# Patient Record
Sex: FEMALE | Race: BLACK OR AFRICAN AMERICAN | Employment: FULL TIME | URBAN - NONMETROPOLITAN AREA
[De-identification: names, ages, dates, MRNs, and addresses within clinical notes are randomized per-mention and may not be internally consistent; named-entity substitution may affect disease eponyms.]

---

## 2018-10-07 ENCOUNTER — APPOINTMENT (OUTPATIENT)
Dept: GENERAL RADIOLOGY | Age: 23
End: 2018-10-07
Payer: COMMERCIAL

## 2018-10-07 ENCOUNTER — HOSPITAL ENCOUNTER (EMERGENCY)
Age: 23
Discharge: HOME | End: 2018-10-07
Payer: COMMERCIAL

## 2018-10-07 VITALS
RESPIRATION RATE: 18 BRPM | DIASTOLIC BLOOD PRESSURE: 78 MMHG | OXYGEN SATURATION: 98 % | HEIGHT: 62 IN | SYSTOLIC BLOOD PRESSURE: 121 MMHG | TEMPERATURE: 98.2 F | HEART RATE: 61 BPM | WEIGHT: 215 LBS | BODY MASS INDEX: 39.56 KG/M2

## 2018-10-07 DIAGNOSIS — S29.019A THORACIC MYOFASCIAL STRAIN, INITIAL ENCOUNTER: Primary | ICD-10-CM

## 2018-10-07 DIAGNOSIS — R07.89 CHEST WALL PAIN: ICD-10-CM

## 2018-10-07 PROCEDURE — 6370000000 HC RX 637 (ALT 250 FOR IP): Performed by: PHYSICIAN ASSISTANT

## 2018-10-07 PROCEDURE — 71101 X-RAY EXAM UNILAT RIBS/CHEST: CPT

## 2018-10-07 PROCEDURE — 99283 EMERGENCY DEPT VISIT LOW MDM: CPT

## 2018-10-07 RX ORDER — NORGESTIMATE AND ETHINYL ESTRADIOL 0.25-0.035
1 KIT ORAL DAILY
COMMUNITY
End: 2021-12-01

## 2018-10-07 RX ORDER — TRAMADOL HYDROCHLORIDE 50 MG/1
50 TABLET ORAL EVERY 6 HOURS PRN
Qty: 12 TABLET | Refills: 0 | Status: SHIPPED | OUTPATIENT
Start: 2018-10-07 | End: 2018-10-10

## 2018-10-07 RX ORDER — TRAMADOL HYDROCHLORIDE 50 MG/1
50 TABLET ORAL ONCE
Status: COMPLETED | OUTPATIENT
Start: 2018-10-07 | End: 2018-10-07

## 2018-10-07 RX ADMIN — TRAMADOL HYDROCHLORIDE 50 MG: 50 TABLET, FILM COATED ORAL at 08:59

## 2018-10-07 ASSESSMENT — ENCOUNTER SYMPTOMS
WHEEZING: 0
DIARRHEA: 0
ABDOMINAL PAIN: 0
VOMITING: 0
EYE PAIN: 0
BACK PAIN: 1
SORE THROAT: 0
EYE DISCHARGE: 0
RHINORRHEA: 0
COUGH: 0
NAUSEA: 0
SHORTNESS OF BREATH: 0

## 2018-10-07 ASSESSMENT — PAIN DESCRIPTION - PAIN TYPE: TYPE: ACUTE PAIN

## 2018-10-07 ASSESSMENT — PAIN SCALES - GENERAL
PAINLEVEL_OUTOF10: 9
PAINLEVEL_OUTOF10: 0
PAINLEVEL_OUTOF10: 10

## 2018-10-07 ASSESSMENT — PAIN DESCRIPTION - ORIENTATION: ORIENTATION: RIGHT

## 2018-10-07 ASSESSMENT — PAIN DESCRIPTION - PROGRESSION: CLINICAL_PROGRESSION: GRADUALLY WORSENING

## 2018-10-07 ASSESSMENT — PAIN DESCRIPTION - FREQUENCY: FREQUENCY: CONTINUOUS

## 2018-10-07 ASSESSMENT — PAIN DESCRIPTION - LOCATION: LOCATION: BACK;SHOULDER

## 2019-04-18 ENCOUNTER — HOSPITAL ENCOUNTER (OUTPATIENT)
Age: 24
Setting detail: SPECIMEN
Discharge: HOME | End: 2019-04-18
Payer: COMMERCIAL

## 2019-04-18 LAB
-: ABNORMAL
AMORPHOUS: ABNORMAL
BACTERIA: ABNORMAL
BILIRUBIN URINE: NEGATIVE
CASTS UA: ABNORMAL /LPF (ref 0–8)
COLOR: YELLOW
COMMENT UA: ABNORMAL
CRYSTALS, UA: ABNORMAL /HPF
DIRECT EXAM: ABNORMAL
EPITHELIAL CELLS UA: ABNORMAL /HPF (ref 0–5)
GLUCOSE URINE: NEGATIVE
KETONES, URINE: NEGATIVE
LEUKOCYTE ESTERASE, URINE: ABNORMAL
Lab: ABNORMAL
MUCUS: ABNORMAL
NITRITE, URINE: POSITIVE
OTHER OBSERVATIONS UA: ABNORMAL
PH UA: 7 (ref 5–8)
PROTEIN UA: NEGATIVE
RBC UA: ABNORMAL /HPF (ref 0–4)
RENAL EPITHELIAL, UA: ABNORMAL /HPF
SPECIFIC GRAVITY UA: 1.02 (ref 1–1.03)
SPECIMEN DESCRIPTION: ABNORMAL
TRICHOMONAS: ABNORMAL
TURBIDITY: ABNORMAL
URINE HGB: ABNORMAL
UROBILINOGEN, URINE: NORMAL
WBC UA: ABNORMAL /HPF (ref 0–5)
YEAST: ABNORMAL

## 2019-04-19 LAB
C. TRACHOMATIS DNA ,URINE: NEGATIVE
N. GONORRHOEAE DNA, URINE: NEGATIVE
SPECIMEN DESCRIPTION: NORMAL

## 2019-04-20 LAB
CULTURE: ABNORMAL
Lab: ABNORMAL
SPECIMEN DESCRIPTION: ABNORMAL

## 2019-04-23 LAB
SEND OUT REPORT: NORMAL
TEST NAME: NORMAL

## 2020-10-13 ENCOUNTER — APPOINTMENT (OUTPATIENT)
Dept: GENERAL RADIOLOGY | Age: 25
End: 2020-10-13
Payer: COMMERCIAL

## 2020-10-13 ENCOUNTER — HOSPITAL ENCOUNTER (EMERGENCY)
Age: 25
Discharge: HOME | End: 2020-10-13
Payer: COMMERCIAL

## 2020-10-13 VITALS
RESPIRATION RATE: 17 BRPM | WEIGHT: 210 LBS | BODY MASS INDEX: 38.64 KG/M2 | OXYGEN SATURATION: 99 % | HEIGHT: 62 IN | DIASTOLIC BLOOD PRESSURE: 86 MMHG | SYSTOLIC BLOOD PRESSURE: 141 MMHG | HEART RATE: 81 BPM | TEMPERATURE: 99.5 F

## 2020-10-13 LAB
ANION GAP SERPL CALCULATED.3IONS-SCNC: 10 MEQ/L (ref 8–16)
BACTERIA: ABNORMAL /HPF
BASOPHILS # BLD: 0.5 %
BASOPHILS ABSOLUTE: 0 THOU/MM3 (ref 0–0.1)
BILIRUBIN URINE: NEGATIVE
BLOOD, URINE: ABNORMAL
BUN BLDV-MCNC: 10 MG/DL (ref 7–22)
CALCIUM SERPL-MCNC: 9.2 MG/DL (ref 8.5–10.5)
CASTS 2: ABNORMAL /LPF
CASTS UA: ABNORMAL /LPF
CHARACTER, URINE: CLEAR
CHLORIDE BLD-SCNC: 102 MEQ/L (ref 98–111)
CO2: 25 MEQ/L (ref 23–33)
COLOR: YELLOW
CREAT SERPL-MCNC: 0.5 MG/DL (ref 0.4–1.2)
CRYSTALS, UA: ABNORMAL
EKG ATRIAL RATE: 87 BPM
EKG P AXIS: 19 DEGREES
EKG P-R INTERVAL: 158 MS
EKG Q-T INTERVAL: 350 MS
EKG QRS DURATION: 82 MS
EKG QTC CALCULATION (BAZETT): 421 MS
EKG R AXIS: 36 DEGREES
EKG T AXIS: 6 DEGREES
EKG VENTRICULAR RATE: 87 BPM
EOSINOPHIL # BLD: 1.1 %
EOSINOPHILS ABSOLUTE: 0.1 THOU/MM3 (ref 0–0.4)
EPITHELIAL CELLS, UA: ABNORMAL /HPF
ERYTHROCYTE [DISTWIDTH] IN BLOOD BY AUTOMATED COUNT: 11.3 % (ref 11.5–14.5)
ERYTHROCYTE [DISTWIDTH] IN BLOOD BY AUTOMATED COUNT: 38.5 FL (ref 35–45)
GFR SERPL CREATININE-BSD FRML MDRD: > 90 ML/MIN/1.73M2
GLUCOSE BLD-MCNC: 83 MG/DL (ref 70–108)
GLUCOSE URINE: NEGATIVE MG/DL
HCT VFR BLD CALC: 40.4 % (ref 37–47)
HEMOGLOBIN: 13.6 GM/DL (ref 12–16)
IMMATURE GRANS (ABS): 0 THOU/MM3 (ref 0–0.07)
IMMATURE GRANULOCYTES: 0 %
KETONES, URINE: NEGATIVE
LEUKOCYTE ESTERASE, URINE: NEGATIVE
LYMPHOCYTES # BLD: 24.3 %
LYMPHOCYTES ABSOLUTE: 1.4 THOU/MM3 (ref 1–4.8)
MCH RBC QN AUTO: 30.8 PG (ref 26–33)
MCHC RBC AUTO-ENTMCNC: 33.7 GM/DL (ref 32.2–35.5)
MCV RBC AUTO: 91.6 FL (ref 81–99)
MISCELLANEOUS 2: ABNORMAL
MONOCYTES # BLD: 7.9 %
MONOCYTES ABSOLUTE: 0.4 THOU/MM3 (ref 0.4–1.3)
NITRITE, URINE: NEGATIVE
NUCLEATED RED BLOOD CELLS: 0 /100 WBC
OSMOLALITY CALCULATION: 272 MOSMOL/KG (ref 275–300)
PH UA: 6 (ref 5–9)
PLATELET # BLD: 388 THOU/MM3 (ref 130–400)
PMV BLD AUTO: 8.9 FL (ref 9.4–12.4)
POTASSIUM REFLEX MAGNESIUM: 4.4 MEQ/L (ref 3.5–5.2)
PREGNANCY, SERUM: NEGATIVE
PROTEIN UA: NEGATIVE
RBC # BLD: 4.41 MILL/MM3 (ref 4.2–5.4)
RBC URINE: ABNORMAL /HPF
RENAL EPITHELIAL, UA: ABNORMAL
SEG NEUTROPHILS: 66.2 %
SEGMENTED NEUTROPHILS ABSOLUTE COUNT: 3.7 THOU/MM3 (ref 1.8–7.7)
SODIUM BLD-SCNC: 137 MEQ/L (ref 135–145)
SPECIFIC GRAVITY, URINE: 1.03 (ref 1–1.03)
TROPONIN T: < 0.01 NG/ML
UROBILINOGEN, URINE: 2 EU/DL (ref 0–1)
WBC # BLD: 5.6 THOU/MM3 (ref 4.8–10.8)
WBC UA: ABNORMAL /HPF
YEAST: ABNORMAL

## 2020-10-13 PROCEDURE — 84484 ASSAY OF TROPONIN QUANT: CPT

## 2020-10-13 PROCEDURE — 84703 CHORIONIC GONADOTROPIN ASSAY: CPT

## 2020-10-13 PROCEDURE — 96372 THER/PROPH/DIAG INJ SC/IM: CPT

## 2020-10-13 PROCEDURE — 81001 URINALYSIS AUTO W/SCOPE: CPT

## 2020-10-13 PROCEDURE — 6360000002 HC RX W HCPCS: Performed by: NURSE PRACTITIONER

## 2020-10-13 PROCEDURE — 99282 EMERGENCY DEPT VISIT SF MDM: CPT

## 2020-10-13 PROCEDURE — 36415 COLL VENOUS BLD VENIPUNCTURE: CPT

## 2020-10-13 PROCEDURE — 72100 X-RAY EXAM L-S SPINE 2/3 VWS: CPT

## 2020-10-13 PROCEDURE — 6370000000 HC RX 637 (ALT 250 FOR IP): Performed by: NURSE PRACTITIONER

## 2020-10-13 PROCEDURE — 85025 COMPLETE CBC W/AUTO DIFF WBC: CPT

## 2020-10-13 PROCEDURE — 99281 EMR DPT VST MAYX REQ PHY/QHP: CPT

## 2020-10-13 PROCEDURE — 93005 ELECTROCARDIOGRAM TRACING: CPT | Performed by: NURSE PRACTITIONER

## 2020-10-13 PROCEDURE — 71046 X-RAY EXAM CHEST 2 VIEWS: CPT

## 2020-10-13 PROCEDURE — 80048 BASIC METABOLIC PNL TOTAL CA: CPT

## 2020-10-13 RX ORDER — ORPHENADRINE CITRATE 100 MG/1
100 TABLET, EXTENDED RELEASE ORAL 2 TIMES DAILY PRN
Qty: 30 TABLET | Refills: 0 | Status: SHIPPED | OUTPATIENT
Start: 2020-10-13 | End: 2020-10-28

## 2020-10-13 RX ORDER — KETOROLAC TROMETHAMINE 30 MG/ML
30 INJECTION, SOLUTION INTRAMUSCULAR; INTRAVENOUS ONCE
Status: COMPLETED | OUTPATIENT
Start: 2020-10-13 | End: 2020-10-13

## 2020-10-13 RX ORDER — LIDOCAINE 4 G/G
1 PATCH TOPICAL DAILY
Status: DISCONTINUED | OUTPATIENT
Start: 2020-10-13 | End: 2020-10-13 | Stop reason: HOSPADM

## 2020-10-13 RX ORDER — PREDNISONE 20 MG/1
20 TABLET ORAL DAILY
Qty: 5 TABLET | Refills: 0 | Status: SHIPPED | OUTPATIENT
Start: 2020-10-13 | End: 2020-10-18

## 2020-10-13 RX ORDER — ORPHENADRINE CITRATE 30 MG/ML
60 INJECTION INTRAMUSCULAR; INTRAVENOUS ONCE
Status: COMPLETED | OUTPATIENT
Start: 2020-10-13 | End: 2020-10-13

## 2020-10-13 RX ADMIN — KETOROLAC TROMETHAMINE 30 MG: 30 INJECTION, SOLUTION INTRAMUSCULAR; INTRAVENOUS at 17:25

## 2020-10-13 RX ADMIN — ORPHENADRINE CITRATE 60 MG: 30 INJECTION INTRAMUSCULAR; INTRAVENOUS at 17:25

## 2020-10-13 ASSESSMENT — ENCOUNTER SYMPTOMS
PHOTOPHOBIA: 0
CONSTIPATION: 0
NAUSEA: 0
SINUS PAIN: 0
SORE THROAT: 0
COUGH: 0
TROUBLE SWALLOWING: 0
CHEST TIGHTNESS: 1
RHINORRHEA: 0
SHORTNESS OF BREATH: 1
WHEEZING: 0
SINUS PRESSURE: 0
DIARRHEA: 0
COLOR CHANGE: 0
BACK PAIN: 1
VOMITING: 0
ABDOMINAL PAIN: 0

## 2020-10-13 ASSESSMENT — PAIN SCALES - GENERAL
PAINLEVEL_OUTOF10: 10
PAINLEVEL_OUTOF10: 10

## 2020-10-13 ASSESSMENT — PAIN DESCRIPTION - ORIENTATION: ORIENTATION: LOWER

## 2020-10-13 ASSESSMENT — PAIN DESCRIPTION - PAIN TYPE: TYPE: ACUTE PAIN

## 2020-10-13 ASSESSMENT — PAIN DESCRIPTION - LOCATION: LOCATION: BACK
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
## 2020-10-13 NOTE — ED TRIAGE NOTES
Presents to ER with complaints of low back pain. Pt states the pain has been ongoing for 3 weeks. States she normally takes tylenol and ibuprofen for the pain. But states this morning she took tylenol with no relief.

## 2021-01-21 ENCOUNTER — HOSPITAL ENCOUNTER (OUTPATIENT)
Age: 26
Setting detail: SPECIMEN
Discharge: HOME | End: 2021-01-21
Payer: COMMERCIAL

## 2021-01-21 LAB
ABSOLUTE EOS #: 0.06 K/UL (ref 0–0.44)
ABSOLUTE IMMATURE GRANULOCYTE: <0.03 K/UL (ref 0–0.3)
ABSOLUTE LYMPH #: 2.15 K/UL (ref 1.1–3.7)
ABSOLUTE MONO #: 0.37 K/UL (ref 0.1–1.2)
ALBUMIN SERPL-MCNC: 3.9 G/DL (ref 3.5–5.2)
ALBUMIN/GLOBULIN RATIO: 1.1 (ref 1–2.5)
ALP BLD-CCNC: 65 U/L (ref 35–104)
ALT SERPL-CCNC: 14 U/L (ref 5–33)
ANION GAP SERPL CALCULATED.3IONS-SCNC: 13 MMOL/L (ref 9–17)
AST SERPL-CCNC: 21 U/L
BASOPHILS # BLD: 1 % (ref 0–2)
BASOPHILS ABSOLUTE: 0.04 K/UL (ref 0–0.2)
BILIRUB SERPL-MCNC: 0.4 MG/DL (ref 0.3–1.2)
BUN BLDV-MCNC: 9 MG/DL (ref 6–20)
BUN/CREAT BLD: NORMAL (ref 9–20)
CALCIUM SERPL-MCNC: 9.1 MG/DL (ref 8.6–10.4)
CHLORIDE BLD-SCNC: 106 MMOL/L (ref 98–107)
CHOLESTEROL/HDL RATIO: 4.1
CHOLESTEROL: 167 MG/DL
CO2: 23 MMOL/L (ref 20–31)
CREAT SERPL-MCNC: 0.55 MG/DL (ref 0.5–0.9)
DIFFERENTIAL TYPE: ABNORMAL
EOSINOPHILS RELATIVE PERCENT: 1 % (ref 1–4)
GFR AFRICAN AMERICAN: >60 ML/MIN
GFR NON-AFRICAN AMERICAN: >60 ML/MIN
GFR SERPL CREATININE-BSD FRML MDRD: NORMAL ML/MIN/{1.73_M2}
GFR SERPL CREATININE-BSD FRML MDRD: NORMAL ML/MIN/{1.73_M2}
GLUCOSE BLD-MCNC: 86 MG/DL (ref 70–99)
HCT VFR BLD CALC: 41.8 % (ref 36.3–47.1)
HDLC SERPL-MCNC: 41 MG/DL
HEMOGLOBIN: 13.4 G/DL (ref 11.9–15.1)
IMMATURE GRANULOCYTES: 0 %
LDL CHOLESTEROL: 109 MG/DL (ref 0–130)
LYMPHOCYTES # BLD: 43 % (ref 24–43)
MCH RBC QN AUTO: 29.6 PG (ref 25.2–33.5)
MCHC RBC AUTO-ENTMCNC: 32.1 G/DL (ref 28.4–34.8)
MCV RBC AUTO: 92.5 FL (ref 82.6–102.9)
MONOCYTES # BLD: 7 % (ref 3–12)
NRBC AUTOMATED: 0 PER 100 WBC
PDW BLD-RTO: 11.5 % (ref 11.8–14.4)
PLATELET # BLD: 461 K/UL (ref 138–453)
PLATELET ESTIMATE: ABNORMAL
PMV BLD AUTO: 9.5 FL (ref 8.1–13.5)
POTASSIUM SERPL-SCNC: 4.1 MMOL/L (ref 3.7–5.3)
RBC # BLD: 4.52 M/UL (ref 3.95–5.11)
RBC # BLD: ABNORMAL 10*6/UL
SEG NEUTROPHILS: 48 % (ref 36–65)
SEGMENTED NEUTROPHILS ABSOLUTE COUNT: 2.37 K/UL (ref 1.5–8.1)
SODIUM BLD-SCNC: 142 MMOL/L (ref 135–144)
TOTAL PROTEIN: 7.4 G/DL (ref 6.4–8.3)
TRIGL SERPL-MCNC: 86 MG/DL
VLDLC SERPL CALC-MCNC: NORMAL MG/DL (ref 1–30)
WBC # BLD: 5 K/UL (ref 3.5–11.3)
WBC # BLD: ABNORMAL 10*3/UL

## 2021-03-03 ENCOUNTER — HOSPITAL ENCOUNTER (OUTPATIENT)
Age: 26
Setting detail: SPECIMEN
Discharge: HOME | End: 2021-03-03
Payer: COMMERCIAL

## 2021-03-04 LAB
C. TRACHOMATIS DNA ,URINE: NEGATIVE
DIRECT EXAM: NORMAL
Lab: NORMAL
N. GONORRHOEAE DNA, URINE: NEGATIVE
SOURCE: NORMAL
SPECIMEN DESCRIPTION: NORMAL
SPECIMEN DESCRIPTION: NORMAL
TRICHOMONAS VAGINALI, MOLECULAR: NEGATIVE

## 2021-06-21 ENCOUNTER — HOSPITAL ENCOUNTER (OUTPATIENT)
Age: 26
Setting detail: SPECIMEN
Discharge: HOME | End: 2021-06-21
Payer: COMMERCIAL

## 2021-06-22 LAB
C. TRACHOMATIS DNA ,URINE: NEGATIVE
DIRECT EXAM: ABNORMAL
Lab: ABNORMAL
N. GONORRHOEAE DNA, URINE: NEGATIVE
SOURCE: NORMAL
SPECIMEN DESCRIPTION: ABNORMAL
SPECIMEN DESCRIPTION: NORMAL
TRICHOMONAS VAGINALI, MOLECULAR: NEGATIVE

## 2021-10-13 ENCOUNTER — HOSPITAL ENCOUNTER (OUTPATIENT)
Age: 26
Setting detail: SPECIMEN
Discharge: HOME | End: 2021-10-13
Payer: COMMERCIAL

## 2021-10-13 LAB
ABSOLUTE EOS #: 0.08 K/UL (ref 0–0.44)
ABSOLUTE IMMATURE GRANULOCYTE: <0.03 K/UL (ref 0–0.3)
ABSOLUTE LYMPH #: 1.94 K/UL (ref 1.1–3.7)
ABSOLUTE MONO #: 0.39 K/UL (ref 0.1–1.2)
ALBUMIN SERPL-MCNC: 4.2 G/DL (ref 3.5–5.2)
ALBUMIN/GLOBULIN RATIO: 1.2 (ref 1–2.5)
ALP BLD-CCNC: 74 U/L (ref 35–104)
ALT SERPL-CCNC: 11 U/L (ref 5–33)
ANION GAP SERPL CALCULATED.3IONS-SCNC: 17 MMOL/L (ref 9–17)
AST SERPL-CCNC: 18 U/L
BASOPHILS # BLD: 1 % (ref 0–2)
BASOPHILS ABSOLUTE: 0.04 K/UL (ref 0–0.2)
BILIRUB SERPL-MCNC: 0.41 MG/DL (ref 0.3–1.2)
BUN BLDV-MCNC: 6 MG/DL (ref 6–20)
BUN/CREAT BLD: NORMAL (ref 9–20)
CALCIUM SERPL-MCNC: 9.3 MG/DL (ref 8.6–10.4)
CHLORIDE BLD-SCNC: 103 MMOL/L (ref 98–107)
CO2: 20 MMOL/L (ref 20–31)
CREAT SERPL-MCNC: 0.6 MG/DL (ref 0.5–0.9)
DIFFERENTIAL TYPE: ABNORMAL
EOSINOPHILS RELATIVE PERCENT: 2 % (ref 1–4)
GFR AFRICAN AMERICAN: >60 ML/MIN
GFR NON-AFRICAN AMERICAN: >60 ML/MIN
GFR SERPL CREATININE-BSD FRML MDRD: NORMAL ML/MIN/{1.73_M2}
GFR SERPL CREATININE-BSD FRML MDRD: NORMAL ML/MIN/{1.73_M2}
GLUCOSE BLD-MCNC: 75 MG/DL (ref 70–99)
HCG QUANTITATIVE: <1 IU/L
HCT VFR BLD CALC: 44.9 % (ref 36.3–47.1)
HEMOGLOBIN: 14 G/DL (ref 11.9–15.1)
IMMATURE GRANULOCYTES: 0 %
LYMPHOCYTES # BLD: 40 % (ref 24–43)
MCH RBC QN AUTO: 29.7 PG (ref 25.2–33.5)
MCHC RBC AUTO-ENTMCNC: 31.2 G/DL (ref 28.4–34.8)
MCV RBC AUTO: 95.1 FL (ref 82.6–102.9)
MONOCYTES # BLD: 8 % (ref 3–12)
NRBC AUTOMATED: 0 PER 100 WBC
PDW BLD-RTO: 11.7 % (ref 11.8–14.4)
PLATELET # BLD: 446 K/UL (ref 138–453)
PLATELET ESTIMATE: ABNORMAL
PMV BLD AUTO: 9.8 FL (ref 8.1–13.5)
POTASSIUM SERPL-SCNC: 4.4 MMOL/L (ref 3.7–5.3)
RBC # BLD: 4.72 M/UL (ref 3.95–5.11)
RBC # BLD: ABNORMAL 10*6/UL
SEG NEUTROPHILS: 49 % (ref 36–65)
SEGMENTED NEUTROPHILS ABSOLUTE COUNT: 2.41 K/UL (ref 1.5–8.1)
SODIUM BLD-SCNC: 140 MMOL/L (ref 135–144)
TOTAL PROTEIN: 7.8 G/DL (ref 6.4–8.3)
WBC # BLD: 4.9 K/UL (ref 3.5–11.3)
WBC # BLD: ABNORMAL 10*3/UL

## 2021-12-01 ENCOUNTER — APPOINTMENT (OUTPATIENT)
Dept: CT IMAGING | Age: 26
End: 2021-12-01
Payer: COMMERCIAL

## 2021-12-01 ENCOUNTER — HOSPITAL ENCOUNTER (EMERGENCY)
Age: 26
Discharge: HOME | End: 2021-12-01
Attending: EMERGENCY MEDICINE
Payer: COMMERCIAL

## 2021-12-01 VITALS
HEIGHT: 62 IN | WEIGHT: 215 LBS | HEART RATE: 76 BPM | DIASTOLIC BLOOD PRESSURE: 95 MMHG | RESPIRATION RATE: 16 BRPM | TEMPERATURE: 98 F | OXYGEN SATURATION: 100 % | SYSTOLIC BLOOD PRESSURE: 130 MMHG | BODY MASS INDEX: 39.56 KG/M2

## 2021-12-01 DIAGNOSIS — R51.9 ACUTE NONINTRACTABLE HEADACHE, UNSPECIFIED HEADACHE TYPE: Primary | ICD-10-CM

## 2021-12-01 LAB
GLUCOSE BLD-MCNC: 94 MG/DL (ref 70–108)
SARS-COV-2, NAAT: NOT DETECTED

## 2021-12-01 PROCEDURE — 6370000000 HC RX 637 (ALT 250 FOR IP): Performed by: EMERGENCY MEDICINE

## 2021-12-01 PROCEDURE — 82948 REAGENT STRIP/BLOOD GLUCOSE: CPT

## 2021-12-01 PROCEDURE — 96372 THER/PROPH/DIAG INJ SC/IM: CPT

## 2021-12-01 PROCEDURE — 87635 SARS-COV-2 COVID-19 AMP PRB: CPT

## 2021-12-01 PROCEDURE — 70450 CT HEAD/BRAIN W/O DYE: CPT

## 2021-12-01 PROCEDURE — 99283 EMERGENCY DEPT VISIT LOW MDM: CPT

## 2021-12-01 PROCEDURE — 6360000002 HC RX W HCPCS: Performed by: EMERGENCY MEDICINE

## 2021-12-01 RX ORDER — NAPROXEN 250 MG/1
250 TABLET ORAL 2 TIMES DAILY WITH MEALS
Qty: 60 TABLET | Refills: 0 | Status: SHIPPED | OUTPATIENT
Start: 2021-12-01

## 2021-12-01 RX ORDER — KETOROLAC TROMETHAMINE 30 MG/ML
30 INJECTION, SOLUTION INTRAMUSCULAR; INTRAVENOUS ONCE
Status: COMPLETED | OUTPATIENT
Start: 2021-12-01 | End: 2021-12-01

## 2021-12-01 RX ORDER — DIPHENHYDRAMINE HYDROCHLORIDE 50 MG/ML
50 INJECTION INTRAMUSCULAR; INTRAVENOUS ONCE
Status: COMPLETED | OUTPATIENT
Start: 2021-12-01 | End: 2021-12-01

## 2021-12-01 RX ORDER — ONDANSETRON 4 MG/1
4 TABLET, ORALLY DISINTEGRATING ORAL EVERY 8 HOURS PRN
Qty: 20 TABLET | Refills: 0 | Status: SHIPPED | OUTPATIENT
Start: 2021-12-01

## 2021-12-01 RX ORDER — ONDANSETRON 4 MG/1
4 TABLET, ORALLY DISINTEGRATING ORAL ONCE
Status: COMPLETED | OUTPATIENT
Start: 2021-12-01 | End: 2021-12-01

## 2021-12-01 RX ORDER — ACETAMINOPHEN 325 MG/1
650 TABLET ORAL ONCE
Status: COMPLETED | OUTPATIENT
Start: 2021-12-01 | End: 2021-12-01

## 2021-12-01 RX ADMIN — KETOROLAC TROMETHAMINE 30 MG: 30 INJECTION, SOLUTION INTRAMUSCULAR; INTRAVENOUS at 09:14

## 2021-12-01 RX ADMIN — ONDANSETRON 4 MG: 4 TABLET, ORALLY DISINTEGRATING ORAL at 09:14

## 2021-12-01 RX ADMIN — DIPHENHYDRAMINE HYDROCHLORIDE 50 MG: 50 INJECTION, SOLUTION INTRAMUSCULAR; INTRAVENOUS at 10:44

## 2021-12-01 RX ADMIN — KETOROLAC TROMETHAMINE 30 MG: 30 INJECTION, SOLUTION INTRAMUSCULAR; INTRAVENOUS at 10:45

## 2021-12-01 RX ADMIN — ACETAMINOPHEN 650 MG: 325 TABLET ORAL at 09:14

## 2021-12-01 ASSESSMENT — ENCOUNTER SYMPTOMS
VOMITING: 1
RHINORRHEA: 0
CONSTIPATION: 0
NAUSEA: 1
SHORTNESS OF BREATH: 0
DIARRHEA: 0
COUGH: 0
ABDOMINAL PAIN: 0
BLOOD IN STOOL: 0
SORE THROAT: 0

## 2021-12-01 ASSESSMENT — PAIN SCALES - GENERAL
PAINLEVEL_OUTOF10: 10
PAINLEVEL_OUTOF10: 10

## 2021-12-01 NOTE — ED PROVIDER NOTES
Nita Hassan 13 COMPLAINT       Chief Complaint   Patient presents with    Headache    Emesis       Nurses Notes reviewed and I agree except as noted in the HPI. HISTORY OF PRESENT ILLNESS    Jessica Armijo is a 23 y.o. pleasant female who presents emergency department for headache. Patient states that she woke up with a headache and some nausea this morning around 5 AM.  She states she has had headaches before but not as sharp and throbbing as this morning. She states this headache started on the right side of her head and now is generalized involving her entire head. She reports that the headache \"makes my face feel tense\". She denies any visual loss, hearing loss, focal numbness, tingling, or weakness. She states this is the first time she has had nausea or vomiting with a headache. She vomited once prior to arrival. She was prescribed ibuprofen in the past by her PCP for headaches. She denies fever, cough, sore throat, runny nose, diarrhea, loss of sense of taste or smell, rash, fall, head trauma or injury, seizure, or loss of consciousness. Her last menstrual cycle was in the middle of November. She denies any exacerbating or alleviating factors for her headache. She reports a family history of diabetes and that she spoke with family members this morning, she became concerned that she might have diabetes and would like to have her blood sugar tested. No other initial complaints or concerns. REVIEW OF SYSTEMS     Review of Systems   Constitutional: Negative for diaphoresis and fever. HENT: Negative for congestion, rhinorrhea and sore throat. Eyes: Negative for visual disturbance. Respiratory: Negative for cough and shortness of breath. Cardiovascular: Negative for chest pain, palpitations and leg swelling. Gastrointestinal: Positive for nausea and vomiting.  Negative for abdominal pain, blood in stool, constipation and diarrhea. Endocrine: Negative for polyuria. Genitourinary: Negative for difficulty urinating and dysuria. Musculoskeletal: Negative for joint swelling. Skin: Negative for rash. Neurological: Positive for headaches. Negative for seizures, syncope, facial asymmetry, speech difficulty and weakness. Hematological: Negative for adenopathy. Psychiatric/Behavioral: Negative for confusion. All other systems reviewed and are negative. PAST MEDICAL HISTORY    has no past medical history on file. SURGICAL HISTORY      has no past surgical history on file. CURRENT MEDICATIONS       Discharge Medication List as of 12/1/2021 11:46 AM          ALLERGIES     has No Known Allergies. FAMILY HISTORY     has no family status information on file. family history is not on file. SOCIAL HISTORY      reports that she has never smoked. She has never used smokeless tobacco. She reports that she does not drink alcohol and does not use drugs. PHYSICAL EXAM     INITIAL VITALS:  height is 5' 2\" (1.575 m) and weight is 215 lb (97.5 kg). Her oral temperature is 98 °F (36.7 °C). Her blood pressure is 130/95 (abnormal) and her pulse is 76. Her respiration is 16 and oxygen saturation is 100%. CONSTITUTIONAL: [Awake, alert, non toxic, well developed, well nourished, no acute distress]  HEAD: [Normocephalic, atraumatic]  EYES: [Pupils equal, round & reactive to light, extraocular movements intact, no nystagmus, clear conjunctiva, non-icteric sclera]  ENT: [External ear canal clear without evidence of cerumen impaction or foreign body, TM's clear without erythema or bulging. Nares patent without drainage, septum appears midline. Moist mucus membranes, oropharynx clear without exudate, erythema, or mass. Uvula midline]  NECK: [Nontender and supple. No meningismus, no appreciated lymphadenopathy. Intact full range of motion. C-spine midline without vertebral tenderness.  Trachea midline.]  CHEST: [Inspection normal, no lesions, equal rise. No crepitus or tenderness upon palpation.]  CARDIOVASCULAR: [Regular rate, rhythm, normal S1 and S2. No appreciated murmurs, rubs, or gallops. No pulse deficits appreciated. Intact distal perfusion. JVD not appreciated.]  PULMONARY: [Respiratory distress absent. Respiratory effort normal. Breath sounds clear to auscultation without rhonchi, rales, or wheezing. No accessory muscle use. No stridor]  ABDOMEN: [Inspection normal, without surgical scars. Soft, non-tender, non-distended, with normoactive bowel sounds. No palpable masses, rebound, or guarding]  BACK: [Intact ROM. No midline vertebral tenderness, step off, or crepitus. No CVA tenderness.]  MUSCULOSKELETAL: [Extremities nontender to palpation. No gross deformity or evidence of external trauma. Intact range of motion. Sensation intact. No clubbing, cyanosis, or edema.]  SKIN: [Warm, dry. No jaundice, rash, urticaria, or petechiae]  NEUROLOGIC: [Alert and oriented x 3, GCS 15, normal mentation for age. Moves all four extremities. No gross sensory deficit. Cerebellar function grossly normal.]  PSYCHIATRIC: [Normal mood and affect, thought process is clear and linear]     DIFFERENTIAL DIAGNOSIS:   Differential Dx Lists - I consider the following to be a partial list of the possible etiologies for the patient's symptoms and based on my clinical findings as well and are part of my medical decision making:    Headache: tension headache, sinusitis, migraine, cluster headache, Less likely: venous thrombosis, meningitis, encephalitis, SAH, SDH, head injury, trauma, intracranial mass, temporal arteritis, concussion,and others    DIAGNOSTIC RESULTS       RADIOLOGY: non-plain film images(s) such as CT,Ultrasound and MRI are read by the radiologist.    CT Head WO Contrast   Final Result      1. Slightly low-lying cerebellar tonsils which could represent normal variation versus Chiari mild dilation.    2. Otherwise negative noncontrast CT scan of the brain. 3. Enlarged adenoids. **This report has been created using voice recognition software. It may contain minor errors which are inherent in voice recognition technology. **      Final report electronically signed by DR Jayshree Vance on 12/1/2021 11:28 AM        [] Visualized and interpreted by me   [x] Radiologist's Wet Read Report Reviewed   [] Discussed withRadiologist.    LABS:   Labs Reviewed   COVID-19, RAPID   POCT GLUCOSE       EMERGENCY DEPARTMENT COURSE:   Vitals:    Vitals:    12/01/21 0834 12/01/21 1026   BP: (!) 130/95    Pulse: 80 76   Resp: 16 16   Temp: 98 °F (36.7 °C)    TempSrc: Oral    SpO2: 100% 100%   Weight: 215 lb (97.5 kg)    Height: 5' 2\" (1.575 m)        Clinically, I do not feel patient needs emergent imaging at this time. She reports a history of headaches for which she is prescribed ibuprofen by her PCP. She has a nonfocal neurologic exam.  She denies any recent head trauma or loss of consciousness. Patient herself stated upon arrival that she was concerned that she'd developed diabetes due to strong family history and asked for that glucose test to be performed. After patient's father arrived, he specifically requested a CT scan be performed. This test was ordered due to family's request.  Results were discussed at length in detail with patient and her father. Questions were asked and answered to the best of my ability. The results of pertinent diagnostic studies and exam findings were discussed. The patients provisional diagnosis and plan of care were discussed with the patient and present family. The patient and/or present family expressed understanding of the diagnosis and plan. The nurse was instructed to provide written instructions and appropriate follow-up information. The patient understands their need and responsibility to obtain additional follow-up as instructed. The patient is comfortable with the plan and discharge.  The risks of medications administered and prescribed were discussed with the patient and family present. CRITICAL CARE:   None    CONSULTS:  None    PROCEDURES:  None    FINAL IMPRESSION      1. Acute nonintractable headache, unspecified headache type          DISPOSITION/PLAN   Discharge    PATIENT REFERRED TO:  RYAN Morataya NPe Joses [address]  [phone]    Schedule an appointment as soon as possible for a visit         DISCHARGE MEDICATIONS:  Discharge Medication List as of 12/1/2021 11:46 AM      START taking these medications    Details   ondansetron (ZOFRAN ODT) 4 MG disintegrating tablet Take 1 tablet by mouth every 8 hours as needed for Nausea, Disp-20 tablet, R-0Normal      naproxen (NAPROSYN) 250 MG tablet Take 1 tablet by mouth 2 times daily (with meals), Disp-60 tablet, R-0Normal             (Please note that portions of this note were completed with a voice recognition program.  Efforts were made to edit the dictations but occasionally words are mis-transcribed.)    Provider:  I personally performed the services described in the documentation, reviewed and edited the documentation which was dictated, and it accurately records my words and actions.     Philomena Pemberton MD 12/1/21 3:28 PM                Philomena Pemberton MD  12/01/21 [id number]

## 2021-12-01 NOTE — Clinical Note
Donald Sutherland was seen and treated in our emergency department on 12/1/2021. She may return to work on 12/02/2021. If you have any questions or concerns, please don't hesitate to call.       Philomena Pemberton MD

## 2021-12-01 NOTE — ED NOTES
This RN to bedside. Pt states she is not feeling better yet. Will notify Dr. Christ Fallon.       Charleen Ryees, MAEVE  12/01/21 1441

## 2021-12-01 NOTE — ED NOTES
Upon first contact with patient this RN receives bedside shift report from Bishop Bosch. Pt in Ct.  This nurse to monitor        Amanda Brewster RN  12/01/21 2463

## 2022-01-12 ENCOUNTER — NURSE ONLY (OUTPATIENT)
Dept: LAB | Age: 27
End: 2022-01-12

## 2022-01-15 LAB
APTIMA MEDIA TYPE: NORMAL
CHLAMYDIA TRACHOMATIS AMPLIFIED DET: NEGATIVE
NEISSERIA GONORRHOEAE BY AMP: NEGATIVE
SPECIMEN SOURCE: NORMAL
T. VAGINALIS SPECIMEN SOURCE: NORMAL
TRICHOMONAS VAGINALIS BY NAA: NEGATIVE
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
## 2022-06-15 ENCOUNTER — OFFICE VISIT (OUTPATIENT)
Dept: FAMILY MEDICINE CLINIC | Age: 27
End: 2022-06-15
Payer: COMMERCIAL

## 2022-06-15 VITALS
SYSTOLIC BLOOD PRESSURE: 112 MMHG | HEART RATE: 76 BPM | DIASTOLIC BLOOD PRESSURE: 68 MMHG | WEIGHT: 221.6 LBS | OXYGEN SATURATION: 99 % | BODY MASS INDEX: 39.27 KG/M2 | TEMPERATURE: 97.7 F | HEIGHT: 63 IN | RESPIRATION RATE: 16 BRPM

## 2022-06-15 DIAGNOSIS — M72.2 PLANTAR FASCIITIS OF RIGHT FOOT: Primary | ICD-10-CM

## 2022-06-15 DIAGNOSIS — E66.09 CLASS 2 OBESITY DUE TO EXCESS CALORIES WITHOUT SERIOUS COMORBIDITY WITH BODY MASS INDEX (BMI) OF 39.0 TO 39.9 IN ADULT: ICD-10-CM

## 2022-06-15 DIAGNOSIS — Z13.31 DEPRESSION SCREEN: ICD-10-CM

## 2022-06-15 DIAGNOSIS — R73.9 HYPERGLYCEMIA: ICD-10-CM

## 2022-06-15 DIAGNOSIS — S39.012D STRAIN OF LUMBAR REGION, SUBSEQUENT ENCOUNTER: ICD-10-CM

## 2022-06-15 PROCEDURE — 99204 OFFICE O/P NEW MOD 45 MIN: CPT | Performed by: STUDENT IN AN ORGANIZED HEALTH CARE EDUCATION/TRAINING PROGRAM

## 2022-06-15 RX ORDER — SUMATRIPTAN 50 MG/1
50 TABLET, FILM COATED ORAL DAILY PRN
COMMUNITY
Start: 2021-12-06

## 2022-06-15 SDOH — ECONOMIC STABILITY: FOOD INSECURITY: WITHIN THE PAST 12 MONTHS, YOU WORRIED THAT YOUR FOOD WOULD RUN OUT BEFORE YOU GOT MONEY TO BUY MORE.: NEVER TRUE

## 2022-06-15 SDOH — ECONOMIC STABILITY: FOOD INSECURITY: WITHIN THE PAST 12 MONTHS, THE FOOD YOU BOUGHT JUST DIDN'T LAST AND YOU DIDN'T HAVE MONEY TO GET MORE.: NEVER TRUE

## 2022-06-15 ASSESSMENT — PATIENT HEALTH QUESTIONNAIRE - PHQ9
SUM OF ALL RESPONSES TO PHQ QUESTIONS 1-9: 0
SUM OF ALL RESPONSES TO PHQ9 QUESTIONS 1 & 2: 0
SUM OF ALL RESPONSES TO PHQ QUESTIONS 1-9: 0
SUM OF ALL RESPONSES TO PHQ QUESTIONS 1-9: 0
1. LITTLE INTEREST OR PLEASURE IN DOING THINGS: 0
2. FEELING DOWN, DEPRESSED OR HOPELESS: 0
SUM OF ALL RESPONSES TO PHQ QUESTIONS 1-9: 0

## 2022-06-15 ASSESSMENT — ENCOUNTER SYMPTOMS
COUGH: 0
ABDOMINAL PAIN: 0
DIARRHEA: 0
SHORTNESS OF BREATH: 0
EYE PAIN: 0
BACK PAIN: 1
CONSTIPATION: 0

## 2022-06-15 ASSESSMENT — SOCIAL DETERMINANTS OF HEALTH (SDOH): HOW HARD IS IT FOR YOU TO PAY FOR THE VERY BASICS LIKE FOOD, HOUSING, MEDICAL CARE, AND HEATING?: NOT VERY HARD

## 2022-06-15 NOTE — PROGRESS NOTES
S: 33 y.o. female with   Chief Complaint   Patient presents with    New Patient     back pain and right ankle swelling       32 yo female here as a new patient for c/o foot pain and LBP    Reviewed hx and ROS in detail with resident prior to exam.  See notes for additional details. BP Readings from Last 3 Encounters:   06/15/22 112/68   12/01/21 (!) 130/95   10/13/20 (!) 141/86     Wt Readings from Last 3 Encounters:   06/15/22 221 lb 9.6 oz (100.5 kg)   12/01/21 215 lb (97.5 kg)   10/13/20 210 lb (95.3 kg)           O: VS:  height is 5' 2.5\" (1.588 m) and weight is 221 lb 9.6 oz (100.5 kg). Her skin temperature is 97.7 °F (36.5 °C). Her blood pressure is 112/68 and her pulse is 76. Her respiration is 16 and oxygen saturation is 99%. AAO/NAD, appropriate affect for mood  CV:  RRR, no murmur  Resp: CTAB  Ext no edema     Diagnosis Orders   1. Plantar fasciitis of right foot  diclofenac sodium (VOLTAREN) 1 % GEL    [facility]   2. Strain of lumbar region, subsequent encounter  diclofenac sodium (VOLTAREN) 1 % GEL    [facility] - [facility]   3. Depression screen  [place] DEPRESSION SCREEN ANNUAL   4. Class 2 obesity due to excess calories without serious comorbidity with body mass index (BMI) of 39.0 to 39.9 in adult  Comprehensive Metabolic Panel    Lipid, Fasting    Hemoglobin A1C    TSH With Reflex Ft4   5. Hyperglycemia  Hemoglobin A1C    TSH With Reflex Ft4       Plan  1. Plantar fasciitis- supportive footwear and HEP instructions given. Ok for tylenol, voltaren prn. Obtain labs and discuss weight mgmt next visit. 2. LBP- tylenol, voltaren and start PT. Obtain fasting labs and return for recheck in 1 mo.       Health Maintenance Due   Topic Date Due    Varicella vaccine (1 of 2 - 2-dose childhood series) Never done    COVID-19 Vaccine (1) Never done    HPV vaccine (1 - 2-dose series) Never done    Depression Screen  Never done    HIV screen  Never done   [facility] Self Hepatitis C screen  Never done    DTaP/Tdap/Td vaccine (1 - Tdap) Never done    Pap smear  03/30/2020         Attending Physician Statement  I have discussed the case, including pertinent history and exam findings with the resident. I also have seen the patient and performed key portions of the examination. I agree with the documented assessment and plan as documented by the resident.   GC modifier added to this encounter      Katy Pérez MD 6/15/2022 2:38 PM

## 2022-06-15 NOTE — PATIENT INSTRUCTIONS
Patient Education        Plantar Fasciitis: Exercises  Introduction  Here are some examples of exercises for you to try. The exercises may be suggested for a condition or for rehabilitation. Start each exercise slowly. Ease off the exercises if you start to have pain. You will be told when to start these exercises and which ones will work bestfor you. How to do the exercises  Towel stretch    1. Sit with your legs extended and knees straight. 2. Place a towel around your foot just under the toes. 3. Hold each end of the towel in each hand, with your hands above your knees. 4. Pull back with the towel so that your foot stretches toward you. 5. Hold the position for at least 15 to 30 seconds. 6. Repeat 2 to 4 times a session, up to 5 sessions a day. Calf stretch    This exercise stretches the muscles at the back of the lower leg (the calf) andthe Achilles tendon. Do this exercise 3 or 4 times a day, 5 days a week. 1. Stand facing a wall with your hands on the wall at about eye level. Put the leg you want to stretch about a step behind your other leg. 2. Keeping your back heel on the floor, bend your front knee until you feel a stretch in the back leg. 3. Hold the stretch for 15 to 30 seconds. Repeat 2 to 4 times. Plantar fascia and calf stretch    Stretching the plantar fascia and calf muscles can increase flexibility and decrease heel pain. You can do this exercise several times each day and beforeand after activity. 1. Stand on a step as shown above. Be sure to hold on to the banister. 2. Slowly let your heels down over the edge of the step as you relax your calf muscles. You should feel a gentle stretch across the bottom of your foot and up the back of your leg to your knee. 3. Hold the stretch about 15 to 30 seconds, and then tighten your calf muscle a little to bring your heel back up to the level of the step. Repeat 2 to 4 times.   Towel curls    Make this exercise more challenging by placing a weighted object, such as asoup can, on the other end of the towel. 1. While sitting, place your foot on a towel on the floor and scrunch the towel toward you with your toes. 2. Then, also using your toes, push the towel away from you. [place] pickups    1. Put marbles on the floor next to a cup.  2. Using your toes, try to lift the marbles up from the floor and put them in the cup. Follow-up care is a key part of your treatment and safety. Be sure to make and go to all appointments, and call your doctor if you are having problems. It's also a good idea to know your test results and keep alist of the medicines you take. Where can you learn more? Go to https://Q Chip.RivalSoft. org and sign in to your oroeco account. Enter S644 in the Akumina box to learn more about \"Plantar Fasciitis: Exercises. \"     If you do not have an account, please click on the \"Sign Up Now\" link. Current as of: July 1, 2021               Content Version: 13.2  © 2006-2022 [employer]. Care instructions adapted under license by [facility] ([facility]). If you have questions about a medical condition or this instruction, always ask your healthcare professional. [address] any warranty or liability for your use of this information. Patient Education        Plantar Fasciitis: Care Instructions  Overview     Plantar fasciitis is pain and inflammation of the plantar fascia, the tissue at the bottom of your foot that connects the heel bone to the toes. The plantar fascia also supports the arch. If you strain the plantar fascia, it can developsmall tears and cause heel pain when you stand or walk. Plantar fasciitis can be caused by running or other sports. It also may occur in people who are overweight or who have high arches or flat feet. You may get plantar fasciitis if you walk or stand for long periods, or have a tightAchilles tendon or calf muscles.   You can improve your foot pain with rest and other care at home. It might takea few weeks to a few months for your foot to heal completely. Follow-up care is a key part of your treatment and safety. Be sure to make and go to all appointments, and call your doctor if you are having problems. It's also a good idea to know your test results and keep alist of the medicines you take. How can you care for yourself at home?  Rest your feet often. Reduce your activity to a level that lets you avoid pain. If possible, do not run or walk on hard surfaces.  Take pain medicines exactly as directed. ? If the doctor gave you a prescription medicine for pain, take it as prescribed. ? If you are not taking a prescription pain medicine, take an over-the-counter anti-inflammatory medicine for pain and swelling, such as ibuprofen (Advil, Motrin) or naproxen (Aleve). Read and follow all instructions on the label.  Use ice massage to help with pain and swelling. You can use an ice cube or an ice cup several times a day. To make an ice cup, fill a paper cup with water and freeze it. Cut off the top of the cup until a half-inch of ice shows. Hold onto the remaining paper to use the cup. Rub the ice in small circles over the area for 5 to 7 minutes.  Contrast baths, which alternate hot and cold water, can also help reduce swelling. But because heat alone may make pain and swelling worse, end a contrast bath with a soak in cold water.  Wear a night splint if your doctor suggests it. A night splint holds your foot with the toes pointed up and the foot and ankle at a 90-degree angle. This position gives the bottom of your foot a constant, gentle stretch.  Do simple exercises such as calf stretches and towel stretches 2 to 3 times each day, especially when you first get up in the morning. These can help the plantar fascia become more flexible. They also make the muscles that support your arch stronger.  Hold these stretches for 15 to 30 seconds per stretch. Repeat 2 to 4 times. ? Stand about 1 foot from a wall. Place the palms of both hands against the wall at chest level. Lean forward against the wall, keeping one leg with the knee straight and heel on the ground while bending the knee of the other leg.  ? Sit down on the floor or a mat with your feet stretched in front of you. Roll up a towel lengthwise, and loop it over the ball of your foot. Holding the towel at both ends, gently pull the towel toward you to stretch your foot.  Wear shoes with good arch support. Athletic shoes or shoes with a well-cushioned sole are good choices.  Replace athletic shoes regularly.  Try heel cups or shoe inserts (orthotics) to help cushion your heel. You can buy these at many shoe stores.  Put on your shoes as soon as you get out of bed. Going barefoot or wearing slippers may make your pain worse.  Reach and stay at a good weight for your height. This puts less strain on your feet. When should you call for help? Call your doctor now or seek immediate medical care if:     You have heel pain with fever, redness, or warmth in your heel.      You cannot put weight on the sore foot. Watch closely for changes in your health, and be sure to contact your doctor if:     You have numbness or tingling in your heel.      Your heel pain lasts more than 2 weeks. Where can you learn more? Go to https://ZykisanniaTellyo.Stackdriver. org and sign in to your M[employer] account. Enter K715 in the [facility] box to learn more about \"Plantar Fasciitis: Care Instructions. \"     If you do not have an account, please click on the \"Sign Up Now\" link. Current as of: July 1, 2021               Content Version: 13.2  © 3347-0562 Healthwise, Incorporated. Care instructions adapted under license by [facility] ([facility]).  If you have questions about a medical condition or this instruction, always ask your healthcare professional. Nimesh Almeida disclaims any warranty or liability for your use of this information. Patient Education        Arch Pain: Exercises  Introduction  Here are some examples of exercises for you to try. The exercises may be suggested for a condition or for rehabilitation. Start each exercise slowly. Ease off the exercises if you start to have pain. You will be told when to start these exercises and which ones will work bestfor you. How to do the exercises  Plantar fascia stretch    1. Sit in a chair and put your affected foot on your other knee. 2. Hold the heel of your foot in one hand, and grasp your toes with the other hand. 3. Pull on your heel (toward your body), and at the same time pull your toes back with your other hand. 4. You should feel a stretch along the bottom of your foot. 5. Hold 15 to 30 seconds. 6. Repeat 2 to 4 times. Plantar fascia stretch (kneeling)    You may want to place a pillow under your knees for this exercise. 1. Get on your hands and knees on the floor. Keep your heels pointing up and the balls of your feet and your toes on the floor. 2. Slowly sit back toward your ankles. 3. If this is too hard, you can try doing it one leg at a time. Stand up, and then kneel on one knee and keep the other leg forward. Place the foot of your forward leg flat on the ground and bend that knee. The heel on the leg still behind you should point up. The ball and toes of that foot should be on the floor. Sit back toward that ankle. 4. Hold 15 to 30 seconds. 5. Repeat 2 to 4 times. Switch legs if you are doing this one leg at a time. Plantar fascia self-massage    1. Sit in a chair. 2. Place your affected foot on a firm, tube-shaped object, such as a can or water bottle. 3. Roll your foot back and forth over the object to massage the bottom of your foot. 4. If you want to do ice massage, fill a water bottle about three-fourths of the way full and freeze before using. 5. Continue for 2 to 5 minutes.   Bilateral calf stretch (knees straight)    1. Place a book on the floor a few inches from a wall or countertop, and put the balls of your feet on it. Your heels should be on the floor. The book needs to be thick enough so that you can feel a gentle stretch in each calf. If you are not steady on your feet, hold on to a chair, counter, or wall while you do this stretch. 2. Keep your knees straight, and lean forward until you feel a stretch in each calf. 3. To get more stretch, add another book or use a thicker book, such as a phone book, a dictionary, or an encyclopedia. 4. Hold the stretch for at least 15 to 30 seconds. 5. Repeat 2 to 4 times. Bilateral calf stretch (knees bent)    1. Place a book on the floor a few inches from a wall or countertop, and put the balls of your feet on it. Your heels should be on the floor. The book needs to be thick enough so that you can feel a gentle stretch in each calf. If you are not steady on your feet, hold on to a chair, counter, or wall while you do this stretch. 2. Bend your knees, and lean forward until you feel a stretch in each calf. 3. To get more stretch, add another book or use a thicker book, such as a phone book, a dictionary, or an encyclopedia. 4. Hold the stretch for at least 15 to 30 seconds. 5. Repeat 2 to 4 times. [place] pick-ups    1. Put some marbles on the floor next to a cup.  2. Sit down, and use the toes of your affected foot to lift up one marble from the floor at a time. Then try to put the marble in the cup.  3. Repeat 8 to 12 times. Towel scrunches    1. Sit down, and place your affected foot on a towel on the floor. You may also do this with both feet on the towel. 2. Scrunch the towel toward you with your toes. Then use your toes to push the towel back into place. 3. Repeat 8 to 12 times. Heel raises on a step    1. Stand on the bottom step of a staircase, facing up toward the stairs. Put the balls of your feet on the step.  If you are not steady on your feet, hold on to the banister or wall. 2. Keeping both knees straight, slowly lift your heels above the step so that you are standing on your toes. Then slowly lower your heels below the step and toward the floor. 3. Return to the starting position, with your feet even with the step. 4. Repeat 8 to 12 times. Follow-up care is a key part of your treatment and safety. Be sure to make and go to all appointments, and call your doctor if you are having problems. It's also a good idea to know your test results and keep alist of the medicines you take. Where can you learn more? Go to https://BitWallpepicewSolarcentury.Displair. org and sign in to your [employer] account. Enter H119 in the Wikibon box to learn more about \"Arch Pain: Exercises. \"     If you do not have an account, please click on the \"Sign Up Now\" link. Current as of: July 1, 2021               Content Version: 13.2  © 2006-2022 Healthwise, [place]. Care instructions adapted under license by [facility] ([facility]). If you have questions about a medical condition or this instruction, always ask your healthcare professional. Toñito Matias any warranty or liability for your use of this information. Patient Education        Acute Low Back Pain: Exercises  Introduction  Here are some examples of typical rehabilitation exercises for your condition. Start each exercise slowly. Ease off the exercise if you start to have pain. Your doctor or physical therapist will tell you when you can start theseexercises and which ones will work best for you. When you are not being active, find a comfortable position for rest. Some people are comfortable on the floor or a medium-firm bed with a small pillow under their head and another under their knees. Some people prefer to lie on their side with a pillow between their knees. Don't stay in one position fortoo long. Take short walks (10 to 20 minutes) every 2 to 3 hours.  Avoid slopes, hills, and stairs until you feel better. Walk only distances you can manage withoutpain, especially leg pain. How to do the exercises  Back stretches    1. Get down on your hands and knees on the floor. 2. Relax your head and allow it to droop. Round your back up toward the ceiling until you feel a nice stretch in your upper, middle, and lower back. Hold this stretch for as long as it feels comfortable, or about 15 to 30 seconds. 3. Return to the starting position with a flat back while you are on your hands and knees. 4. Let your back sway by pressing your stomach toward the floor. Lift your buttocks toward the ceiling. 5. Hold this position for 15 to 30 seconds. 6. Repeat 2 to 4 times. Follow-up care is a key part of your treatment and safety. Be sure to make and go to all appointments, and call your doctor if you are having problems. It's also a good idea to know your test results and keep alist of the medicines you take. Where can you learn more? Go to https://Optimal Internet Solutions.AltiGen Communications. org and sign in to your BioCurity account. Enter B667 in the Beyond Commerce box to learn more about \"Acute Low Back Pain: Exercises. \"     If you do not have an account, please click on the \"Sign Up Now\" link. Current as of: September 8, 2021               Content Version: 13.2  © 7953-5230 Healthwise, Incorporated. Care instructions adapted under license by [facility] ([facility]). If you have questions about a medical condition or this instruction, always ask your healthcare professional. [place] [address] any warranty or liability for your use of this information. Patient Education        Therapeutic Ball: Back Exercises  Introduction  Here are some examples of typical exercises for your condition. Start each exercise slowly. Ease off the exercise if you start to have pain. Your doctor or physical therapist will tell you when you can start these exercises andwhich ones will work best for you.   To prepare, make sure that your ball is the right size for you. When inflated and firm, it should allow you to sit with your hips and knees bent at about a90-degree angle (like the letter L). How to do the exercises  Seated position on ball    7. Use this exercise to get used to moving on the ball and to find your best sitting position. 8. Sit comfortably on the ball with your feet about hip-width apart. If you feel unsteady, rest your hands on the ball near your hips. 9. As you do this exercise, try to keep your shoulders and upper body relaxed and still. 10. Using your stomach and back muscles to move your pelvis, roll the ball forward. This will round your back. 11. Still using your stomach and back muscles, roll the ball back. You will arch your back. 12. Repeat this rounding-arching motion a few times. 13. Stop in between the two positions, where your back is not rounded or arched. This is called your neutral position. Pelvic rotation    1. Sit tall on the ball. 2. Slowly rotate your hips in a [place] pattern. Keep the movement focused at your hips. 3. Repeat, but [place] in the other direction. 4. Repeat 8 to 12 times. Postural sitting    1. Use this position to find a stable, relaxed posture on the ball. You can use this position as your starting point for other ball exercises. If you feel unsteady on the ball, start on a chair first.  2. Sit on a ball or chair, with your feet planted straight in front of you. 3. Imagine that a string at the top of your head is pulling you straight up. Think of yourself as 2 inches taller than you are.  4. Slightly tuck your chin. 5. Keep your shoulders back and relaxed. Knee extension    1. Sit tall on the ball with your feet planted in front of you, hip-width apart. As you do this exercise, avoid slumping your shoulders and arching your back. 2. Rest your hands on the ball near your hip or a steady object next to you.  (If you feel very stable on the ball, rest your hands in your lap or at your side.)  3. Slowly straighten one leg at the knee. Slowly lower it back down. Repeat with the other leg. 4. Repeat this exercise 8 to 12 times. Roll-ups    1. Lie on your back with your knees bent, feet resting on the floor. 2. Lay the ball on your thighs. Rest your hands up high on the ball. 3. Raising your head and shoulder blades, roll the ball up your thighs. Exhale as you roll up. 4. If this is hard on your neck, gently support your lower head and upper neck with one hand. Don't use that hand to pull your head up. 5. Repeat 8 to 12 times. Ball curls    1. Lie on your back with your ankles resting on the ball, knees straight. 2. Use your legs to roll the exercise ball toward you. Allow your knees to bend and move closer to your chest.  3. Pause briefly, and then roll the ball to the starting position. Try to keep the ball rolling straight. You will feel the muscles in your lower belly working. 4. Repeat 8 to 12 times. Bridge with ball under legs    1. Lie on your back with your legs up, calves resting on the ball. For more challenge, rest your heels on the ball. 2. Look up at the ceiling, and keep your chin relaxed. You can place a small pillow under your head or neck for comfort. 3. With your arms by your side, press your hands onto the floor for stability. 4. Tighten your belly muscles by pulling in your belly button toward your spine. 5. Push your heels down toward the floor, squeeze your buttocks, and lift your hips off the floor until your shoulders, hips, and knees are all in a straight line. 6. Try to keep the ball steady. Hold for about 6 seconds as you continue to breathe normally. 7. Slowly lower your hips back down to the floor. 8. Repeat 8 to 12 times. Ball curls with bridge    1. Start flat on your back with your ankles resting on the ball. 2. Look up at the ceiling, and keep your chin relaxed.  You can place a small pillow under your head or neck for comfort. 3. With your arms by your side, press your hands onto the floor for stability. 4. Tighten your belly muscles by pulling in your belly button toward your spine. 5. Push your heels down toward the floor, squeeze your buttocks, and lift your hips off the floor until your shoulders, hips, and knees are all in a straight line. 6. While holding the bridge position, roll the ball toward you with your heels. Keep your hips as level as you can. 7. Pause briefly, and then roll the ball back out. Try to keep the ball rolling straight. You will feel the muscles in your lower belly working as you straighten your legs. 8. Lower your hips, and return to your starting position. 9. Repeat 8 to 12 times. 10. When you can keep your body and the ball steady throughout this exercise, you're ready for more challenge. Try keeping your hips raised while rolling the ball out, holding the bridge, and rolling back, a few times in a row. Praying lucy    1. Kneel upright with the ball in front of you. 2. To start, clasp your hands together. Rest them on the ball in front of you. 3. As you do this exercise, keep your back and hips straight and tighten your belly and buttocks muscles. Keep your knees in place. 4. Press on the ball with your arms. Lean forward from the knees. This rolls the ball forward. You will bear most of your weight on your arms. 5. If your back starts to ache, you've gone too far. Pull back a bit. 6. Roll back to the start position. 7. Repeat 8 to 12 times. Walk-out plank on ball    1. Kneel over the ball. Place your hands on the floor in front of you. 2. Walk your hands forward until your legs are straight on the ball. This is the plank position. 3. When in plank position, hold your body straight and tighten your belly and buttocks muscles. Keep your chin slightly tucked. 4. Roll as far forward as you can without losing your balance or letting your hips drop.  You may stop with the ball under your thighs, or even under your knees or shins. 5. Hold a few seconds, then walk your hands back and return to the start position. 6. Repeat 8 to 12 times. Push-up with thighs on ball    1. Kneel over the ball. Place your hands on the floor in front of you. 2. Walk your hands forward until your legs are straight on the ball. This is the plank position. 3. When in plank position, hold your body straight and tighten your belly and buttocks muscles. Keep your chin slightly tucked. 4. Roll as far forward as you can without losing your balance or letting your hips drop. You may stop with the ball under your thighs, or even under your knees or shins. 5. Bend your elbows. Slowly lower your body toward the ground as far as you can without losing your balance. 6. If your wrists hurt, try moving your hands a little farther apart so they're not right under your shoulders. 7. Slowly straighten your arms. 8. Do 8 to 12 of these push-ups. Wall squat with ball    1. Stand facing away from a wall. Place your feet about shoulder-width apart. 2. Place the ball between your middle back and the wall. Move your feet out in front of you so they are about a foot in front of your hips. 3. Keep your arms at your sides, or put your hands on your hips. 4. Slowly squat down as if you are going to sit in a chair, rolling your back over the ball as you squat. The ball should move with you but stay pressed into the wall. 5. Be sure that your knees do not go in front of your toes as you squat. 6. Hold for 6 seconds. 7. Slowly rise to your standing position. 8. Repeat 8 to 12 times. Child's pose with ball    1. Kneeling upright with your back straight, rest your hands on the ball in front of you. 2. Breathe out as you bend at the hips, and roll the ball forward. Lower your chest toward the ground, and drop your hips back toward your heels.   3. To stretch your upper back and shoulders, hold this position for 15 to 30 seconds. 4. Repeat 2 to 4 times. Follow-up care is a key part of your treatment and safety. Be sure to make and go to all appointments, and call your doctor if you are having problems. It's also a good idea to know your test results and keep alist of the medicines you take. Where can you learn more? Go to https://PlayFirstpepiceweb.Biomedix vascular solution. org and sign in to your Storytime Studios account. Enter L837 in the RIGID box to learn more about \"Therapeutic Ball: Back Exercises. \"     If you do not have an account, please click on the \"Sign Up Now\" link. Current as of: July 1, 2021               Content Version: 13.2  © 2006-2022 [employer]. Care instructions adapted under license by [facility] ([facility]). If you have questions about a medical condition or this instruction, always ask your healthcare professional. [address] any warranty or liability for your use of this information.

## 2022-06-15 NOTE — PROGRESS NOTES
S: 33 y.o. female with   Chief Complaint   Patient presents with    New Patient     back pain and right ankle swelling     HPI per Dr. Olivia Landa    BP Readings from Last 3 Encounters:   06/15/22 112/68   12/01/21 (!) 130/95   10/13/20 (!) 141/86     Wt Readings from Last 3 Encounters:   06/15/22 221 lb 9.6 oz (100.5 kg)   12/01/21 215 lb (97.5 kg)   10/13/20 210 lb (95.3 kg)           O: VS:  height is 5' 2.5\" (1.588 m) and weight is 221 lb 9.6 oz (100.5 kg). Her skin temperature is 97.7 °F (36.5 °C). Her blood pressure is 112/68 and her pulse is 76. Her respiration is 16 and oxygen saturation is 99%. Diagnosis Orders   1. Plantar fasciitis of right foot     2. Strain of lumbar region, subsequent encounter     3. Depression screen         Plan  Labs as ordered. Refer to PT for low back pain. Exercises given for plantar fascitis. Voltaren gel prescribed for as needed pain. Okay for OTC tylenol use for pain. Health Maintenance Due   Topic Date Due    Varicella vaccine (1 of 2 - 2-dose childhood series) Never done    COVID-19 Vaccine (1) Never done    HPV vaccine (1 - 2-dose series) Never done    Depression Screen  Never done    HIV screen  Never done    Hepatitis C screen  Never done    DTaP/Tdap/Td vaccine (1 - Tdap) Never done    Pap smear  03/30/2020         Attending Physician Statement  I have discussed the case, including pertinent history and exam findings with the resident. I agree with the documented assessment and plan as documented by the resident.       Ashwini Yeboah DO 6/15/2022 2:30 PM

## 2022-07-14 ENCOUNTER — HOSPITAL ENCOUNTER (OUTPATIENT)
Dept: PHYSICAL THERAPY | Age: 27
Setting detail: THERAPIES SERIES
Discharge: HOME | End: 2022-07-14
Payer: COMMERCIAL

## 2022-07-14 PROCEDURE — 97161 PT EVAL LOW COMPLEX 20 MIN: CPT

## 2022-07-14 PROCEDURE — 97110 THERAPEUTIC EXERCISES: CPT
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
## 2022-07-19 ENCOUNTER — HOSPITAL ENCOUNTER (OUTPATIENT)
Dept: PHYSICAL THERAPY | Age: 27
Setting detail: THERAPIES SERIES
Discharge: HOME | End: 2022-07-19
Payer: COMMERCIAL

## 2022-07-19 PROCEDURE — 97110 THERAPEUTIC EXERCISES: CPT

## 2022-07-19 ASSESSMENT — ENCOUNTER SYMPTOMS
CONSTIPATION: 0
BACK PAIN: 1
DIARRHEA: 0
EYE PAIN: 0
COUGH: 0
SHORTNESS OF BREATH: 0
ABDOMINAL PAIN: 0
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
## 2022-07-20 PROBLEM — E66.09 CLASS 2 OBESITY DUE TO EXCESS CALORIES WITHOUT SERIOUS COMORBIDITY WITH BODY MASS INDEX (BMI) OF 39.0 TO 39.9 IN ADULT: Status: ACTIVE | Noted: 2022-07-20

## 2022-07-21 ENCOUNTER — TELEPHONE (OUTPATIENT)
Dept: FAMILY MEDICINE CLINIC | Age: 27
End: 2022-07-21

## 2022-07-21 ENCOUNTER — OFFICE VISIT (OUTPATIENT)
Dept: FAMILY MEDICINE CLINIC | Age: 27
End: 2022-07-21
Payer: COMMERCIAL

## 2022-07-21 ENCOUNTER — HOSPITAL ENCOUNTER (OUTPATIENT)
Dept: PHYSICAL THERAPY | Age: 27
Setting detail: THERAPIES SERIES
Discharge: HOME | End: 2022-07-21
Payer: COMMERCIAL

## 2022-07-21 ENCOUNTER — HOSPITAL ENCOUNTER (OUTPATIENT)
Age: 27
Discharge: HOME | End: 2022-07-21
Payer: COMMERCIAL

## 2022-07-21 VITALS
DIASTOLIC BLOOD PRESSURE: 76 MMHG | TEMPERATURE: 97.4 F | RESPIRATION RATE: 12 BRPM | WEIGHT: 221.6 LBS | HEART RATE: 82 BPM | SYSTOLIC BLOOD PRESSURE: 118 MMHG | OXYGEN SATURATION: 98 % | BODY MASS INDEX: 39.27 KG/M2 | HEIGHT: 63 IN

## 2022-07-21 DIAGNOSIS — E66.09 CLASS 2 OBESITY DUE TO EXCESS CALORIES WITHOUT SERIOUS COMORBIDITY WITH BODY MASS INDEX (BMI) OF 39.0 TO 39.9 IN ADULT: Primary | ICD-10-CM

## 2022-07-21 DIAGNOSIS — Z11.4 SCREENING FOR HIV WITHOUT PRESENCE OF RISK FACTORS: ICD-10-CM

## 2022-07-21 DIAGNOSIS — Z11.59 NEED FOR HEPATITIS C SCREENING TEST: ICD-10-CM

## 2022-07-21 DIAGNOSIS — E66.09 CLASS 2 OBESITY DUE TO EXCESS CALORIES WITHOUT SERIOUS COMORBIDITY WITH BODY MASS INDEX (BMI) OF 39.0 TO 39.9 IN ADULT: ICD-10-CM

## 2022-07-21 DIAGNOSIS — R73.9 HYPERGLYCEMIA: ICD-10-CM

## 2022-07-21 LAB
ALBUMIN SERPL-MCNC: 3.8 G/DL (ref 3.5–5.1)
ALP BLD-CCNC: 76 U/L (ref 38–126)
ALT SERPL-CCNC: 12 U/L (ref 11–66)
ANION GAP SERPL CALCULATED.3IONS-SCNC: 11 MEQ/L (ref 8–16)
AST SERPL-CCNC: 15 U/L (ref 5–40)
AVERAGE GLUCOSE: 87 MG/DL (ref 70–126)
BILIRUB SERPL-MCNC: 0.3 MG/DL (ref 0.3–1.2)
BUN BLDV-MCNC: 10 MG/DL (ref 7–22)
CALCIUM SERPL-MCNC: 9 MG/DL (ref 8.5–10.5)
CHLORIDE BLD-SCNC: 104 MEQ/L (ref 98–111)
CHOLESTEROL, FASTING: 168 MG/DL (ref 100–199)
CO2: 24 MEQ/L (ref 23–33)
CREAT SERPL-MCNC: 0.6 MG/DL (ref 0.4–1.2)
GFR SERPL CREATININE-BSD FRML MDRD: > 90 ML/MIN/1.73M2
GLUCOSE BLD-MCNC: 87 MG/DL (ref 70–108)
HBA1C MFR BLD: 4.9 % (ref 4.4–6.4)
HDLC SERPL-MCNC: 39 MG/DL
HEPATITIS C ANTIBODY: NEGATIVE
LDL CHOLESTEROL CALCULATED: 115 MG/DL
POTASSIUM SERPL-SCNC: 4.2 MEQ/L (ref 3.5–5.2)
SODIUM BLD-SCNC: 139 MEQ/L (ref 135–145)
TOTAL PROTEIN: 7.2 G/DL (ref 6.1–8)
TRIGLYCERIDE, FASTING: 71 MG/DL (ref 0–199)
TSH SERPL DL<=0.05 MIU/L-ACNC: 1.8 UIU/ML (ref 0.4–4.2)

## 2022-07-21 PROCEDURE — G8417 CALC BMI ABV UP PARAM F/U: HCPCS | Performed by: STUDENT IN AN ORGANIZED HEALTH CARE EDUCATION/TRAINING PROGRAM

## 2022-07-21 PROCEDURE — 1036F TOBACCO NON-USER: CPT | Performed by: STUDENT IN AN ORGANIZED HEALTH CARE EDUCATION/TRAINING PROGRAM

## 2022-07-21 PROCEDURE — 84443 ASSAY THYROID STIM HORMONE: CPT

## 2022-07-21 PROCEDURE — 80061 LIPID PANEL: CPT

## 2022-07-21 PROCEDURE — 80053 COMPREHEN METABOLIC PANEL: CPT

## 2022-07-21 PROCEDURE — 36415 COLL VENOUS BLD VENIPUNCTURE: CPT

## 2022-07-21 PROCEDURE — 99213 OFFICE O/P EST LOW 20 MIN: CPT | Performed by: STUDENT IN AN ORGANIZED HEALTH CARE EDUCATION/TRAINING PROGRAM

## 2022-07-21 PROCEDURE — G8427 DOCREV CUR MEDS BY ELIG CLIN: HCPCS | Performed by: STUDENT IN AN ORGANIZED HEALTH CARE EDUCATION/TRAINING PROGRAM

## 2022-07-21 PROCEDURE — 83036 HEMOGLOBIN GLYCOSYLATED A1C: CPT

## 2022-07-21 PROCEDURE — 97110 THERAPEUTIC EXERCISES: CPT

## 2022-07-21 NOTE — PROGRESS NOTES
Health Maintenance Due   Topic Date Due    COVID-19 Vaccine (1) Never done    Varicella vaccine (1 of 2 - 2-dose childhood series) Never done    HPV vaccine (1 - 2-dose series) Never done    HIV screen  Never done    Hepatitis C screen  Never done    DTaP/Tdap/Td vaccine (1 - Tdap) Never done    Pap smear  03/30/2020
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
## 2022-07-21 NOTE — PROGRESS NOTES
Attending Physician Statement  I have discussed the case, including pertinent history and exam findings with the resident. I agree with the documented assessment and plan as documented by the resident.   GE modifier added to this encounter      Antoine Alexis MD 7/21/2022 3:48 PM

## 2022-07-21 NOTE — PATIENT INSTRUCTIONS
Thank you   Thank you for trusting us with your healthcare needs. You may receive a survey regarding today's visit. It would help us out if you would take a few moments to provide your feedback. We value your input. Please bring in ALL medications BOTTLES, including inhalers, herbal supplements, over the counter, prescribed & non-prescribed medicine. The office would like actual medication bottles and a list.   Please note our OFFICE POLICIES:   Prior to getting your labs drawn, please check with your insurance company for benefits and eligibility of lab services. Often, insurance companies cover certain tests for preventative visits only. It is patient's responsibility to see what is covered. We are unable to change a diagnosis after the test has been performed. Lab orders will not be re-printed. Please hold onto your original lab orders and take them to your lab to be completed. If you no show your scheduled appointment three times, you will be dismissed from this practice. Reschedules must be completed 24 hours prior to your schedule appointment. If the list below has been completed, PLEASE FAX RECORDS TO OUR OFFICE @ [phone].  Once the records have been received we will update your records at our office:  Health Maintenance Due   Topic Date Due    COVID-19 Vaccine (1) Never done    Varicella vaccine (1 of 2 - 2-dose childhood series) Never done    HPV vaccine (1 - 2-dose series) Never done    HIV screen  Never done    Hepatitis C screen  Never done    DTaP/Tdap/Td vaccine (1 - Tdap) Never done    Pap smear  03/30/2020

## 2022-07-22 ENCOUNTER — HOSPITAL ENCOUNTER (OUTPATIENT)
Age: 27
Discharge: HOME | End: 2022-07-22
Payer: COMMERCIAL

## 2022-07-22 DIAGNOSIS — Z11.4 SCREENING FOR HIV WITHOUT PRESENCE OF RISK FACTORS: ICD-10-CM

## 2022-07-22 DIAGNOSIS — Z11.59 NEED FOR HEPATITIS C SCREENING TEST: ICD-10-CM

## 2022-07-22 LAB — HEPATITIS C ANTIBODY: NEGATIVE

## 2022-07-22 PROCEDURE — 36415 COLL VENOUS BLD VENIPUNCTURE: CPT

## 2022-07-22 PROCEDURE — 86803 HEPATITIS C AB TEST: CPT

## 2022-07-22 PROCEDURE — 87389 HIV-1 AG W/HIV-1&-2 AB AG IA: CPT

## 2022-07-23 LAB — HIV AG/AB: NONREACTIVE

## 2022-07-26 ENCOUNTER — HOSPITAL ENCOUNTER (OUTPATIENT)
Dept: PHYSICAL THERAPY | Age: 27
Setting detail: THERAPIES SERIES
Discharge: HOME | End: 2022-07-26
Payer: COMMERCIAL

## 2022-07-26 PROCEDURE — 97110 THERAPEUTIC EXERCISES: CPT
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
## 2022-07-28 ENCOUNTER — HOSPITAL ENCOUNTER (OUTPATIENT)
Dept: PHYSICAL THERAPY | Age: 27
Setting detail: THERAPIES SERIES
Discharge: HOME | End: 2022-07-28
Payer: COMMERCIAL

## 2022-07-28 PROCEDURE — 97110 THERAPEUTIC EXERCISES: CPT
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
## 2022-08-02 ENCOUNTER — HOSPITAL ENCOUNTER (OUTPATIENT)
Dept: PHYSICAL THERAPY | Age: 27
Setting detail: THERAPIES SERIES
End: 2022-08-02
Payer: COMMERCIAL

## 2022-08-04 ENCOUNTER — HOSPITAL ENCOUNTER (OUTPATIENT)
Dept: PHYSICAL THERAPY | Age: 27
Setting detail: THERAPIES SERIES
Discharge: HOME | End: 2022-08-04
Payer: COMMERCIAL

## 2022-08-04 PROCEDURE — 97110 THERAPEUTIC EXERCISES: CPT
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
## 2022-08-09 ENCOUNTER — HOSPITAL ENCOUNTER (OUTPATIENT)
Dept: PHYSICAL THERAPY | Age: 27
Setting detail: THERAPIES SERIES
Discharge: HOME | End: 2022-08-09
Payer: COMMERCIAL

## 2022-08-09 PROCEDURE — 97110 THERAPEUTIC EXERCISES: CPT
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
## 2022-08-11 ENCOUNTER — HOSPITAL ENCOUNTER (OUTPATIENT)
Dept: PHYSICAL THERAPY | Age: 27
Setting detail: THERAPIES SERIES
Discharge: HOME | End: 2022-08-11
Payer: COMMERCIAL

## 2022-08-11 PROCEDURE — 97110 THERAPEUTIC EXERCISES: CPT
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
## 2022-08-29 ASSESSMENT — ENCOUNTER SYMPTOMS
CONSTIPATION: 0
EYE PAIN: 0
COUGH: 0
ABDOMINAL PAIN: 0
DIARRHEA: 0
BACK PAIN: 1
SHORTNESS OF BREATH: 0
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
## 2022-09-01 ENCOUNTER — OFFICE VISIT (OUTPATIENT)
Dept: FAMILY MEDICINE CLINIC | Age: 27
End: 2022-09-01
Payer: COMMERCIAL

## 2022-09-01 VITALS
HEIGHT: 63 IN | DIASTOLIC BLOOD PRESSURE: 86 MMHG | TEMPERATURE: 97 F | BODY MASS INDEX: 39.87 KG/M2 | SYSTOLIC BLOOD PRESSURE: 128 MMHG | WEIGHT: 225 LBS | RESPIRATION RATE: 10 BRPM | OXYGEN SATURATION: 98 % | HEART RATE: 80 BPM

## 2022-09-01 DIAGNOSIS — E66.09 CLASS 2 OBESITY DUE TO EXCESS CALORIES WITHOUT SERIOUS COMORBIDITY WITH BODY MASS INDEX (BMI) OF 39.0 TO 39.9 IN ADULT: Primary | ICD-10-CM

## 2022-09-01 PROCEDURE — 99213 OFFICE O/P EST LOW 20 MIN: CPT | Performed by: STUDENT IN AN ORGANIZED HEALTH CARE EDUCATION/TRAINING PROGRAM

## 2022-09-01 NOTE — PROGRESS NOTES
Attending Physician Statement  I have discussed the case, including pertinent history and exam findings with the resident. I agree with the documented assessment and plan as documented by the resident.   GE modifier added to this encounter      David Arora MD 9/1/2022 12:43 PM

## 2022-09-28 NOTE — PROGRESS NOTES
Jessica Israel  1995    Chief Complaint   Patient presents with    Obesity     Plan of care for Kike Peacock       Pt is a 33 y.o. female who presents for evaluation to establish a nutrition education plan of care. His/her PCP, Dr. Mary Diaz, has requested that this patient be seen for dietician to educate regarding a dietitian for diet. I recommend that we start with 3 hours of dietician education this year as this is their first year of education and 2 hours of dietician education the next year as needed to maintain/ improve  control. No past medical history on file. No past surgical history on file. Current Outpatient Medications   Medication Sig Dispense Refill    nitrofurantoin, macrocrystal-monohydrate, (MACROBID) 100 MG capsule Take 1 capsule by mouth 2 times daily for 5 days 10 capsule 0    diclofenac sodium (VOLTAREN) 1 % GEL Apply 4 g topically 4 times daily 50 g 0    SUMAtriptan (IMITREX) 50 MG tablet Take 50 mg by mouth daily as needed      ondansetron (ZOFRAN ODT) 4 MG disintegrating tablet Take 1 tablet by mouth every 8 hours as needed for Nausea 20 tablet 0    naproxen (NAPROSYN) 250 MG tablet Take 1 tablet by mouth 2 times daily (with meals) (Patient taking differently: Take 250 mg by mouth as needed) 60 tablet 0     No current facility-administered medications for this visit.        No Known Allergies    Social History     Socioeconomic History    Marital status: Single     Spouse name: Not on file    Number of children: Not on file    Years of education: Not on file    Highest education level: Not on file   Occupational History    Not on file   Tobacco Use    Smoking status: Never    Smokeless tobacco: Never   Vaping Use    Vaping Use: Never used   Substance and Sexual Activity    Alcohol use: Yes     Comment: 3 x per year    Drug use: No    Sexual activity: Not on file   Other Topics Concern    Not on file   Social History Narrative    Not on file     Social Determinants of Health     Financial Resource Strain: Low Risk     Difficulty of Paying Living Expenses: Not very hard   Food Insecurity: No Food Insecurity    Worried About Running Out of Food in the Last Year: Never true    Ran Out of Food in the Last Year: Never true   Transportation Needs: Not on file   Physical Activity: Insufficiently Active    Days of Exercise per Week: 3 days    Minutes of Exercise per Session: 30 min   Stress: Not on file   Social Connections: Not on file   Intimate Partner Violence: Not At Risk    Fear of Current or Ex-Partner: No    Emotionally Abused: No    Physically Abused: No    Sexually Abused: No   Housing Stability: Not on file       Family History   Problem Relation Age of Onset    Stroke Father     High Blood Pressure Father        Review of Systems - General ROS: negative for - chills or fever  Psychological ROS: negative for - anxiety and depression  Hematological and Lymphatic ROS: No history of blood clots or bleeding disorder. Respiratory ROS: no cough, shortness of breath, or wheezing  Cardiovascular ROS: no chest pain or dyspnea on exertion, tolerates a flight of stairs, vacuuming  Gastrointestinal ROS: no abdominal pain, change in bowel habits, or black or bloody stools  Genito-Urinary ROS: no dysuria, trouble voiding, or hematuria  Musculoskeletal ROS: negative for - muscle pain or muscular weakness  Neurological ROS: negative for - headaches, numbness/tingling, seizures or weakness  Dermatological ROS: negative for - rash or skin lesion changes    Blood pressure 126/80, pulse 84, temperature 97.6 °F (36.4 °C), height 5' 2.52\" (1.588 m), weight 230 lb 3.2 oz (104.4 kg), last menstrual period 09/05/2022.     Physical Examination: General appearance -alert, well appearing, and in no distress  Mental status - alert, oriented to person, place, and time  Head - atraumatic, normocephalic  Eyes - pupils equal and reactive to light, extraocular muscles intact  Ears - external ears are normal, hearing is grossly normal  Mouth - oral pharynx clear, mucous membranes moist  Neck - supple, no significant adenopathy  Chest - clear to auscultation, no wheezes, rales or rhonchi, symmetric air entry  Heart - normal rate, regular rhythm, normal S1, S2, no murmurs, rubs, clicks or gallops  Abdomen -soft, nontender, nondistended  Neurological - alert, oriented, cranial nerves II-XII intact, motor and sensation are grossly intact bilateral upper and lower extremities. Extremities - peripheral pulses normal, no pedal edema, no clubbing or cyanosis  Skin - warm and dry    Diagnostic data:   I have reviewed recent diagnostic testing including labs. Assessment and Plan:       Diagnosis Orders   1. Class 3 severe obesity due to excess calories without serious comorbidity with body mass index (BMI) of 40.0 to 44.9 in adult [facility] [place]. [facility] - Dietitian                Allergies: Patient has no known allergies.     Follow-up: 1 year

## 2022-10-03 ENCOUNTER — OFFICE VISIT (OUTPATIENT)
Dept: INTERNAL MEDICINE CLINIC | Age: 27
End: 2022-10-03
Payer: COMMERCIAL

## 2022-10-03 VITALS
TEMPERATURE: 97.6 F | BODY MASS INDEX: 40.79 KG/M2 | SYSTOLIC BLOOD PRESSURE: 126 MMHG | WEIGHT: 230.2 LBS | HEART RATE: 84 BPM | HEIGHT: 63 IN | DIASTOLIC BLOOD PRESSURE: 80 MMHG

## 2022-10-03 DIAGNOSIS — E66.01 CLASS 3 SEVERE OBESITY DUE TO EXCESS CALORIES WITHOUT SERIOUS COMORBIDITY WITH BODY MASS INDEX (BMI) OF 40.0 TO 44.9 IN ADULT (HCC): Primary | ICD-10-CM

## 2022-10-03 PROCEDURE — G8484 FLU IMMUNIZE NO ADMIN: HCPCS | Performed by: INTERNAL MEDICINE

## 2022-10-03 PROCEDURE — G8417 CALC BMI ABV UP PARAM F/U: HCPCS | Performed by: INTERNAL MEDICINE

## 2022-10-03 PROCEDURE — 99202 OFFICE O/P NEW SF 15 MIN: CPT | Performed by: STUDENT IN AN ORGANIZED HEALTH CARE EDUCATION/TRAINING PROGRAM

## 2022-10-03 PROCEDURE — 1036F TOBACCO NON-USER: CPT | Performed by: INTERNAL MEDICINE

## 2022-10-03 PROCEDURE — G8427 DOCREV CUR MEDS BY ELIG CLIN: HCPCS | Performed by: INTERNAL MEDICINE

## 2022-10-03 SDOH — HEALTH STABILITY: PHYSICAL HEALTH: ON AVERAGE, HOW MANY MINUTES DO YOU ENGAGE IN EXERCISE AT THIS LEVEL?: 30 MIN

## 2022-10-03 SDOH — HEALTH STABILITY: PHYSICAL HEALTH: ON AVERAGE, HOW MANY DAYS PER WEEK DO YOU ENGAGE IN MODERATE TO STRENUOUS EXERCISE (LIKE A BRISK WALK)?: 3 DAYS

## 2022-10-03 ASSESSMENT — SOCIAL DETERMINANTS OF HEALTH (SDOH)
WITHIN THE LAST YEAR, HAVE YOU BEEN KICKED, HIT, SLAPPED, OR OTHERWISE PHYSICALLY HURT BY YOUR PARTNER OR EX-PARTNER?: NO
WITHIN THE LAST YEAR, HAVE YOU BEEN AFRAID OF YOUR PARTNER OR EX-PARTNER?: NO
WITHIN THE LAST YEAR, HAVE TO BEEN RAPED OR FORCED TO HAVE ANY KIND OF SEXUAL ACTIVITY BY YOUR PARTNER OR EX-PARTNER?: NO
WITHIN THE LAST YEAR, HAVE YOU BEEN HUMILIATED OR EMOTIONALLY ABUSED IN OTHER WAYS BY YOUR PARTNER OR EX-PARTNER?: NO

## 2023-01-16 ENCOUNTER — NURSE ONLY (OUTPATIENT)
Dept: LAB | Age: 28
End: 2023-01-16

## 2023-01-18 LAB
C. TRACHOMATIS DNA,THIN PREP: NEGATIVE
N. GONORRHOEAE DNA, THIN PREP: NEGATIVE
SOURCE: NORMAL

## 2023-01-19 LAB
APTIMA MEDIA TYPE: ABNORMAL
T. VAGINALIS SPECIMEN SOURCE: ABNORMAL
TRICHOMONAS VAGINALIS BY NAA: POSITIVE

## 2023-01-25 LAB — CYTOLOGY THIN PREP PAP: NORMAL

## 2023-03-13 ENCOUNTER — APPOINTMENT (OUTPATIENT)
Dept: GENERAL RADIOLOGY | Age: 28
End: 2023-03-13
Payer: COMMERCIAL

## 2023-03-13 ENCOUNTER — HOSPITAL ENCOUNTER (EMERGENCY)
Age: 28
Discharge: HOME | End: 2023-03-13
Attending: STUDENT IN AN ORGANIZED HEALTH CARE EDUCATION/TRAINING PROGRAM
Payer: COMMERCIAL

## 2023-03-13 ENCOUNTER — APPOINTMENT (OUTPATIENT)
Dept: CT IMAGING | Age: 28
End: 2023-03-13
Payer: COMMERCIAL

## 2023-03-13 VITALS
RESPIRATION RATE: 16 BRPM | DIASTOLIC BLOOD PRESSURE: 69 MMHG | OXYGEN SATURATION: 98 % | SYSTOLIC BLOOD PRESSURE: 114 MMHG | TEMPERATURE: 99 F | BODY MASS INDEX: 41.37 KG/M2 | HEART RATE: 81 BPM | WEIGHT: 230 LBS

## 2023-03-13 DIAGNOSIS — M62.838 CERVICAL PARASPINAL MUSCLE SPASM: Primary | ICD-10-CM
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
## 2023-03-13 PROCEDURE — 73080 X-RAY EXAM OF ELBOW: CPT

## 2023-03-13 PROCEDURE — 73030 X-RAY EXAM OF SHOULDER: CPT

## 2023-03-13 PROCEDURE — 70450 CT HEAD/BRAIN W/O DYE: CPT

## 2023-03-13 PROCEDURE — 6370000000 HC RX 637 (ALT 250 FOR IP): Performed by: STUDENT IN AN ORGANIZED HEALTH CARE EDUCATION/TRAINING PROGRAM

## 2023-03-13 PROCEDURE — 71046 X-RAY EXAM CHEST 2 VIEWS: CPT

## 2023-03-13 PROCEDURE — 99284 EMERGENCY DEPT VISIT MOD MDM: CPT

## 2023-03-13 PROCEDURE — 72125 CT NECK SPINE W/O DYE: CPT

## 2023-03-13 RX ORDER — METHOCARBAMOL 500 MG/1
500 TABLET, FILM COATED ORAL 4 TIMES DAILY
Qty: 40 TABLET | Refills: 0 | Status: SHIPPED | OUTPATIENT
Start: 2023-03-13 | End: 2023-03-23

## 2023-03-13 RX ORDER — ACETAMINOPHEN 500 MG
1000 TABLET ORAL ONCE
Status: COMPLETED | OUTPATIENT
Start: 2023-03-13 | End: 2023-03-13

## 2023-03-13 RX ORDER — NAPROXEN 500 MG/1
500 TABLET ORAL 2 TIMES DAILY PRN
Qty: 14 TABLET | Refills: 0 | Status: SHIPPED | OUTPATIENT
Start: 2023-03-13 | End: 2023-03-20

## 2023-03-13 RX ADMIN — ACETAMINOPHEN 1000 MG: 500 TABLET ORAL at 21:15

## 2023-03-13 ASSESSMENT — PAIN SCALES - GENERAL: PAINLEVEL_OUTOF10: 9

## 2023-03-13 ASSESSMENT — PAIN - FUNCTIONAL ASSESSMENT: PAIN_FUNCTIONAL_ASSESSMENT: 0-10

## 2023-03-13 NOTE — Clinical Note
Holli Hendrickson was seen and treated in our emergency department on 3/13/2023. She may return to work on 03/14/2023. If you have any questions or concerns, please don't hesitate to call.       Alondra Weaver MD

## 2023-03-14 ENCOUNTER — TELEPHONE (OUTPATIENT)
Dept: FAMILY MEDICINE CLINIC | Age: 28
End: 2023-03-14

## 2023-03-14 DIAGNOSIS — M54.2 NECK PAIN: ICD-10-CM

## 2023-03-14 DIAGNOSIS — S39.012D STRAIN OF LUMBAR REGION, SUBSEQUENT ENCOUNTER: Primary | ICD-10-CM

## 2023-03-14 NOTE — DISCHARGE INSTRUCTIONS
You can use Tylenol or naproxen as needed for pain  Do not use naproxen with ibuprofen or other NSAIDs  You can use Robaxin to help with muscle spasms; be careful as it can make you drowsy so do not use when driving or operating heavy machinery  You can use ice packs or warm compresses  Follow-up with your family doctor within a week.   If you start noticing numbness tingling or weakness return to the ED or seek medical attention
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
## 2023-04-01 RX ORDER — NORETHINDRONE ACETATE AND ETHINYL ESTRADIOL 1MG-20(21)
1 KIT ORAL DAILY
COMMUNITY
Start: 2023-02-17

## 2023-04-01 ASSESSMENT — ENCOUNTER SYMPTOMS
CONSTIPATION: 0
SHORTNESS OF BREATH: 0
ABDOMINAL PAIN: 0
BACK PAIN: 1
DIARRHEA: 0
EYE PAIN: 0
COUGH: 0

## 2023-04-04 ENCOUNTER — OFFICE VISIT (OUTPATIENT)
Dept: FAMILY MEDICINE CLINIC | Age: 28
End: 2023-04-04
Payer: COMMERCIAL

## 2023-04-04 VITALS
DIASTOLIC BLOOD PRESSURE: 72 MMHG | TEMPERATURE: 98.1 F | HEIGHT: 63 IN | BODY MASS INDEX: 40.15 KG/M2 | WEIGHT: 226.6 LBS | HEART RATE: 78 BPM | OXYGEN SATURATION: 98 % | SYSTOLIC BLOOD PRESSURE: 114 MMHG | RESPIRATION RATE: 16 BRPM

## 2023-04-04 DIAGNOSIS — E66.09 CLASS 2 OBESITY DUE TO EXCESS CALORIES WITHOUT SERIOUS COMORBIDITY WITH BODY MASS INDEX (BMI) OF 39.0 TO 39.9 IN ADULT: Primary | ICD-10-CM

## 2023-04-04 DIAGNOSIS — M54.2 ACUTE NECK PAIN: ICD-10-CM

## 2023-04-04 PROCEDURE — G8417 CALC BMI ABV UP PARAM F/U: HCPCS | Performed by: STUDENT IN AN ORGANIZED HEALTH CARE EDUCATION/TRAINING PROGRAM

## 2023-04-04 PROCEDURE — G8427 DOCREV CUR MEDS BY ELIG CLIN: HCPCS | Performed by: STUDENT IN AN ORGANIZED HEALTH CARE EDUCATION/TRAINING PROGRAM

## 2023-04-04 PROCEDURE — 1036F TOBACCO NON-USER: CPT | Performed by: STUDENT IN AN ORGANIZED HEALTH CARE EDUCATION/TRAINING PROGRAM

## 2023-04-04 PROCEDURE — 99213 OFFICE O/P EST LOW 20 MIN: CPT | Performed by: STUDENT IN AN ORGANIZED HEALTH CARE EDUCATION/TRAINING PROGRAM

## 2023-04-04 NOTE — LETTER
April 4, 2023       Tigist January YOB: 1995   [id number] [address] Date of Visit:  4/4/2023       To Whom It May Concern: It is my medical opinion that Tigist January may return to work on 4/5. If you have any questions or concerns, please don't hesitate to call.     Sincerely,        Amando Curry MD

## 2023-04-04 NOTE — PATIENT INSTRUCTIONS
1g of tylenol twice a day (the 500mg tylenol extra strength arthritis is recommended, 2 of those)   Can do voltaren 4 times a day   Continue with dietary changes, call dietician for return appointment, if no improvement consider medications
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
## 2023-05-25 LAB — DIABETIC RETINOPATHY: NORMAL

## 2023-06-08 ENCOUNTER — HOSPITAL ENCOUNTER (EMERGENCY)
Age: 28
Discharge: HOME | End: 2023-06-08
Payer: COMMERCIAL

## 2023-06-08 ENCOUNTER — APPOINTMENT (OUTPATIENT)
Dept: GENERAL RADIOLOGY | Age: 28
End: 2023-06-08
Payer: COMMERCIAL

## 2023-06-08 VITALS
HEART RATE: 88 BPM | OXYGEN SATURATION: 98 % | SYSTOLIC BLOOD PRESSURE: 115 MMHG | TEMPERATURE: 97.4 F | DIASTOLIC BLOOD PRESSURE: 63 MMHG | RESPIRATION RATE: 20 BRPM

## 2023-06-08 DIAGNOSIS — G89.29 ACUTE EXACERBATION OF CHRONIC LOW BACK PAIN: ICD-10-CM

## 2023-06-08 DIAGNOSIS — M54.50 ACUTE EXACERBATION OF CHRONIC LOW BACK PAIN: ICD-10-CM

## 2023-06-08 DIAGNOSIS — S39.012A STRAIN OF LUMBAR REGION, INITIAL ENCOUNTER: Primary | ICD-10-CM

## 2023-06-08 PROCEDURE — 6370000000 HC RX 637 (ALT 250 FOR IP): Performed by: NURSE PRACTITIONER

## 2023-06-08 PROCEDURE — 72100 X-RAY EXAM L-S SPINE 2/3 VWS: CPT

## 2023-06-08 PROCEDURE — 6360000002 HC RX W HCPCS: Performed by: NURSE PRACTITIONER

## 2023-06-08 RX ORDER — PREDNISONE 20 MG/1
20 TABLET ORAL 2 TIMES DAILY
Qty: 10 TABLET | Refills: 0 | Status: SHIPPED | OUTPATIENT
Start: 2023-06-08 | End: 2023-06-13

## 2023-06-08 RX ORDER — CYCLOBENZAPRINE HCL 5 MG
5 TABLET ORAL 3 TIMES DAILY PRN
Qty: 30 TABLET | Refills: 0 | Status: SHIPPED | OUTPATIENT
Start: 2023-06-08 | End: 2023-06-18

## 2023-06-08 RX ORDER — IBUPROFEN 800 MG/1
800 TABLET ORAL EVERY 8 HOURS PRN
Qty: 30 TABLET | Refills: 0 | Status: SHIPPED | OUTPATIENT
Start: 2023-06-08

## 2023-06-08 RX ORDER — ORPHENADRINE CITRATE 30 MG/ML
60 INJECTION INTRAMUSCULAR; INTRAVENOUS ONCE
Status: COMPLETED | OUTPATIENT
Start: 2023-06-08 | End: 2023-06-08

## 2023-06-08 RX ORDER — LIDOCAINE 4 G/G
1 PATCH TOPICAL ONCE
Status: DISCONTINUED | OUTPATIENT
Start: 2023-06-08 | End: 2023-06-09 | Stop reason: HOSPADM

## 2023-06-08 RX ORDER — KETOROLAC TROMETHAMINE 30 MG/ML
30 INJECTION, SOLUTION INTRAMUSCULAR; INTRAVENOUS ONCE
Status: COMPLETED | OUTPATIENT
Start: 2023-06-08 | End: 2023-06-08

## 2023-06-08 RX ADMIN — KETOROLAC TROMETHAMINE 30 MG: 30 INJECTION, SOLUTION INTRAMUSCULAR; INTRAVENOUS at 19:59

## 2023-06-08 RX ADMIN — ORPHENADRINE CITRATE 60 MG: 60 INJECTION INTRAMUSCULAR; INTRAVENOUS at 19:59

## 2023-06-08 ASSESSMENT — PAIN DESCRIPTION - LOCATION: LOCATION: BACK

## 2023-06-08 ASSESSMENT — PAIN SCALES - GENERAL: PAINLEVEL_OUTOF10: 8

## 2023-06-08 NOTE — ED NOTES
Patient to ED for lower back pain that started 3 days ago with no known injury.  Patient took naproxen this AM that has not helped      Sid Edwards RN  06/08/23 [id number]

## 2023-06-09 NOTE — ED NOTES
RN xray tech regarding delay in xray results. States they will look into it.       Kat Beltran RN  06/08/23 [id number]

## 2023-06-09 NOTE — ED PROVIDER NOTES
disposition and plan was determined by myself. Strict return precautions and follow up instructions were discussed with the patient prior to discharge, with which the patient agrees. Physical assessment findings, diagnostic testing(s) if applicable, and vital signs reviewed with patient/patient representative. Questions answered. Medications asdirected, including OTC medications for supportive care. Education provided on medications. Differential diagnosis(s) discussed with patient/patient representative. Home care/self care instructions reviewed withpatient/patient representative. Patient is to follow-up with family care provider in 2-3 days if no improvement. Patient is to go to the emergency department if symptoms worsen. Patient/patient representative isaware of care plan, questions answered, verbalizes understanding and is in agreement. ED Medications administered this visit:  (None if blank)  Medications   orphenadrine (NORFLEX) injection 60 mg (60 mg IntraMUSCular Given 6/8/23 1959)   ketorolac (TORADOL) injection 30 mg (30 mg IntraMUSCular Given 6/8/23 1959)         CONSULTS:  None    PROCEDURES: (None if blank)  Procedures:     CRITICAL CARE: (None if blank)      DISCHARGE PRESCRIPTIONS: (None if blank)  Discharge Medication List as of 6/8/2023 10:50 PM        START taking these medications    Details   predniSONE (DELTASONE) 20 MG tablet Take 1 tablet by mouth 2 times daily for 5 days, Disp-10 tablet, R-0Normal      !! diclofenac sodium (VOLTAREN) 1 % GEL Apply 4 g topically 4 times daily, Topical, 4 TIMES DAILY Starting Thu 6/8/2023, Disp-350 g, R-0, Normal      cyclobenzaprine (FLEXERIL) 5 MG tablet Take 1 tablet by mouth 3 times daily as needed for Muscle spasms, Disp-30 tablet, R-0Normal      ibuprofen (ADVIL;MOTRIN) 800 MG tablet Take 1 tablet by mouth every 8 hours as needed for Pain, Disp-30 tablet, R-0Normal       !! - Potential duplicate medications found.  Please discuss with

## 2023-06-09 NOTE — DISCHARGE INSTRUCTIONS
apart. Attempting to maintain the trunk as perpendicular as possible to the floor, eyes focused ahead, and feet flat on the floor, the subject slowly lowers his body by flexing his knees    Return to the Emergency Department for inability to move legs, worsening of pain, tingling / loss of sensation, any other care or concern.

## 2025-03-04 NOTE — Clinical Note
Claude Books was seen and treated in our emergency department on 12/1/2021. She may return to work on 12/02/2021. If you have any questions or concerns, please don't hesitate to call.       Bindu Husain MD done